# Patient Record
Sex: FEMALE | Race: WHITE | NOT HISPANIC OR LATINO | Employment: OTHER | URBAN - METROPOLITAN AREA
[De-identification: names, ages, dates, MRNs, and addresses within clinical notes are randomized per-mention and may not be internally consistent; named-entity substitution may affect disease eponyms.]

---

## 2019-12-31 ENCOUNTER — APPOINTMENT (OUTPATIENT)
Dept: RADIOLOGY | Facility: CLINIC | Age: 49
End: 2019-12-31
Payer: COMMERCIAL

## 2019-12-31 ENCOUNTER — HOSPITAL ENCOUNTER (EMERGENCY)
Facility: HOSPITAL | Age: 49
Discharge: HOME | End: 2019-12-31
Attending: EMERGENCY MEDICINE | Admitting: EMERGENCY MEDICINE
Payer: COMMERCIAL

## 2019-12-31 ENCOUNTER — OFFICE VISIT (OUTPATIENT)
Dept: URGENT CARE | Facility: CLINIC | Age: 49
End: 2019-12-31
Payer: COMMERCIAL

## 2019-12-31 VITALS
SYSTOLIC BLOOD PRESSURE: 116 MMHG | TEMPERATURE: 97.6 F | HEIGHT: 65 IN | OXYGEN SATURATION: 98 % | HEART RATE: 80 BPM | BODY MASS INDEX: 33.07 KG/M2 | WEIGHT: 198.5 LBS | RESPIRATION RATE: 18 BRPM | DIASTOLIC BLOOD PRESSURE: 73 MMHG

## 2019-12-31 VITALS
BODY MASS INDEX: 32.89 KG/M2 | SYSTOLIC BLOOD PRESSURE: 132 MMHG | HEIGHT: 65 IN | WEIGHT: 197.4 LBS | RESPIRATION RATE: 16 BRPM | OXYGEN SATURATION: 98 % | HEART RATE: 86 BPM | TEMPERATURE: 99.1 F | DIASTOLIC BLOOD PRESSURE: 82 MMHG

## 2019-12-31 DIAGNOSIS — R12 HEARTBURN: ICD-10-CM

## 2019-12-31 DIAGNOSIS — R07.9 CHEST PAIN, UNSPECIFIED TYPE: Primary | ICD-10-CM

## 2019-12-31 DIAGNOSIS — R07.9 CHEST PAIN, UNSPECIFIED TYPE: ICD-10-CM

## 2019-12-31 DIAGNOSIS — R10.13 DYSPEPSIA: ICD-10-CM

## 2019-12-31 DIAGNOSIS — K29.70 GASTRITIS: Primary | ICD-10-CM

## 2019-12-31 LAB
ALBUMIN SERPL BCP-MCNC: 4.2 G/DL (ref 3.5–5)
ALP SERPL-CCNC: 85 U/L (ref 46–116)
ALT SERPL W P-5'-P-CCNC: 69 U/L (ref 12–78)
ANION GAP SERPL CALCULATED.3IONS-SCNC: 10 MMOL/L (ref 4–13)
AST SERPL W P-5'-P-CCNC: 31 U/L (ref 5–45)
ATRIAL RATE: 84 BPM
BASOPHILS # BLD AUTO: 0.04 THOUSANDS/ΜL (ref 0–0.1)
BASOPHILS NFR BLD AUTO: 1 % (ref 0–1)
BILIRUB SERPL-MCNC: 0.4 MG/DL (ref 0.2–1)
BUN SERPL-MCNC: 17 MG/DL (ref 5–25)
CALCIUM SERPL-MCNC: 9 MG/DL (ref 8.3–10.1)
CHLORIDE SERPL-SCNC: 102 MMOL/L (ref 100–108)
CO2 SERPL-SCNC: 29 MMOL/L (ref 21–32)
CREAT SERPL-MCNC: 0.9 MG/DL (ref 0.6–1.3)
EOSINOPHIL # BLD AUTO: 0.19 THOUSAND/ΜL (ref 0–0.61)
EOSINOPHIL NFR BLD AUTO: 3 % (ref 0–6)
ERYTHROCYTE [DISTWIDTH] IN BLOOD BY AUTOMATED COUNT: 11.9 % (ref 11.6–15.1)
GFR SERPL CREATININE-BSD FRML MDRD: 74 ML/MIN/1.73SQ M
GLUCOSE SERPL-MCNC: 90 MG/DL (ref 65–140)
HCT VFR BLD AUTO: 41.4 % (ref 34.8–46.1)
HGB BLD-MCNC: 14.2 G/DL (ref 11.5–15.4)
IMM GRANULOCYTES # BLD AUTO: 0.02 THOUSAND/UL (ref 0–0.2)
IMM GRANULOCYTES NFR BLD AUTO: 0 % (ref 0–2)
LIPASE SERPL-CCNC: 129 U/L (ref 73–393)
LYMPHOCYTES # BLD AUTO: 2.9 THOUSANDS/ΜL (ref 0.6–4.47)
LYMPHOCYTES NFR BLD AUTO: 38 % (ref 14–44)
MCH RBC QN AUTO: 33.6 PG (ref 26.8–34.3)
MCHC RBC AUTO-ENTMCNC: 34.3 G/DL (ref 31.4–37.4)
MCV RBC AUTO: 98 FL (ref 82–98)
MONOCYTES # BLD AUTO: 0.85 THOUSAND/ΜL (ref 0.17–1.22)
MONOCYTES NFR BLD AUTO: 11 % (ref 4–12)
NEUTROPHILS # BLD AUTO: 3.7 THOUSANDS/ΜL (ref 1.85–7.62)
NEUTS SEG NFR BLD AUTO: 47 % (ref 43–75)
NRBC BLD AUTO-RTO: 0 /100 WBCS
P AXIS: 56 DEGREES
PLATELET # BLD AUTO: 237 THOUSANDS/UL (ref 149–390)
PMV BLD AUTO: 8.8 FL (ref 8.9–12.7)
POTASSIUM SERPL-SCNC: 3.7 MMOL/L (ref 3.5–5.3)
PR INTERVAL: 152 MS
PROT SERPL-MCNC: 7.8 G/DL (ref 6.4–8.2)
QRS AXIS: 11 DEGREES
QRSD INTERVAL: 80 MS
QT INTERVAL: 364 MS
QTC INTERVAL: 430 MS
RBC # BLD AUTO: 4.23 MILLION/UL (ref 3.81–5.12)
SODIUM SERPL-SCNC: 141 MMOL/L (ref 136–145)
T WAVE AXIS: 22 DEGREES
TROPONIN I SERPL-MCNC: <0.02 NG/ML
VENTRICULAR RATE: 84 BPM
WBC # BLD AUTO: 7.7 THOUSAND/UL (ref 4.31–10.16)

## 2019-12-31 PROCEDURE — 84484 ASSAY OF TROPONIN QUANT: CPT | Performed by: PHYSICIAN ASSISTANT

## 2019-12-31 PROCEDURE — 99283 EMERGENCY DEPT VISIT LOW MDM: CPT

## 2019-12-31 PROCEDURE — 71046 X-RAY EXAM CHEST 2 VIEWS: CPT

## 2019-12-31 PROCEDURE — 85025 COMPLETE CBC W/AUTO DIFF WBC: CPT | Performed by: PHYSICIAN ASSISTANT

## 2019-12-31 PROCEDURE — 36415 COLL VENOUS BLD VENIPUNCTURE: CPT | Performed by: PHYSICIAN ASSISTANT

## 2019-12-31 PROCEDURE — 93010 ELECTROCARDIOGRAM REPORT: CPT | Performed by: INTERNAL MEDICINE

## 2019-12-31 PROCEDURE — 99285 EMERGENCY DEPT VISIT HI MDM: CPT | Performed by: PHYSICIAN ASSISTANT

## 2019-12-31 PROCEDURE — 93005 ELECTROCARDIOGRAM TRACING: CPT | Performed by: PHYSICIAN ASSISTANT

## 2019-12-31 PROCEDURE — 93005 ELECTROCARDIOGRAM TRACING: CPT

## 2019-12-31 PROCEDURE — 96374 THER/PROPH/DIAG INJ IV PUSH: CPT

## 2019-12-31 PROCEDURE — G0382 LEV 3 HOSP TYPE B ED VISIT: HCPCS | Performed by: PHYSICIAN ASSISTANT

## 2019-12-31 PROCEDURE — 83690 ASSAY OF LIPASE: CPT | Performed by: PHYSICIAN ASSISTANT

## 2019-12-31 PROCEDURE — C9113 INJ PANTOPRAZOLE SODIUM, VIA: HCPCS | Performed by: PHYSICIAN ASSISTANT

## 2019-12-31 PROCEDURE — 80053 COMPREHEN METABOLIC PANEL: CPT | Performed by: PHYSICIAN ASSISTANT

## 2019-12-31 PROCEDURE — 99283 EMERGENCY DEPT VISIT LOW MDM: CPT | Performed by: PHYSICIAN ASSISTANT

## 2019-12-31 RX ORDER — FAMOTIDINE 40 MG/1
40 TABLET, FILM COATED ORAL DAILY
COMMUNITY
Start: 2019-12-21

## 2019-12-31 RX ORDER — BOSENTAN 125 MG/1
125 TABLET, FILM COATED ORAL 2 TIMES DAILY
COMMUNITY

## 2019-12-31 RX ORDER — PANTOPRAZOLE SODIUM 40 MG/1
40 INJECTION, POWDER, FOR SOLUTION INTRAVENOUS ONCE
Status: COMPLETED | OUTPATIENT
Start: 2019-12-31 | End: 2019-12-31

## 2019-12-31 RX ORDER — MAGNESIUM HYDROXIDE/ALUMINUM HYDROXICE/SIMETHICONE 120; 1200; 1200 MG/30ML; MG/30ML; MG/30ML
30 SUSPENSION ORAL ONCE
Status: COMPLETED | OUTPATIENT
Start: 2019-12-31 | End: 2019-12-31

## 2019-12-31 RX ORDER — LISINOPRIL AND HYDROCHLOROTHIAZIDE 20; 12.5 MG/1; MG/1
1 TABLET ORAL DAILY
COMMUNITY
Start: 2019-11-04

## 2019-12-31 RX ORDER — SUCRALFATE 1 G/1
1 TABLET ORAL 4 TIMES DAILY
Qty: 30 TABLET | Refills: 0 | Status: SHIPPED | OUTPATIENT
Start: 2019-12-31

## 2019-12-31 RX ORDER — LIDOCAINE HYDROCHLORIDE 20 MG/ML
15 SOLUTION OROPHARYNGEAL ONCE
Status: COMPLETED | OUTPATIENT
Start: 2019-12-31 | End: 2019-12-31

## 2019-12-31 RX ADMIN — PANTOPRAZOLE SODIUM 40 MG: 40 INJECTION, POWDER, FOR SOLUTION INTRAVENOUS at 16:44

## 2019-12-31 RX ADMIN — MAGNESIUM HYDROXIDE/ALUMINUM HYDROXICE/SIMETHICONE 30 ML: 120; 1200; 1200 SUSPENSION ORAL at 14:57

## 2019-12-31 RX ADMIN — LIDOCAINE HYDROCHLORIDE 15 ML: 20 SOLUTION OROPHARYNGEAL at 14:58

## 2019-12-31 NOTE — PROGRESS NOTES
NAME: Bel Lindsay is a 44 y o  female  : 1970    MRN: [id number]      Assessment and Plan   Chest pain, unspecified type [R07 9]  1  Chest pain, unspecified type  ECG 12 lead    XR chest pa & lateral    Transfer to other facility   2  Dyspepsia  aluminum-magnesium hydroxide-simethicone (MYLANTA) 200-200-20 mg/5 mL oral suspension 30 mL    Lidocaine Viscous HCl (XYLOCAINE) 2 % mucosal solution 15 mL     Administrations This Visit     aluminum-magnesium hydroxide-simethicone (MYLANTA) 200-200-20 mg/5 mL oral suspension 30 mL     Admin Date  2019 Action  Given Dose  30 mL Route  Oral Administered By  Leonardo Stephenson RN          Lidocaine Viscous HCl (XYLOCAINE) 2 % mucosal solution 15 mL     Admin Date  2019 Action  Given Dose  15 mL Route  Swish & Swallow Administered By  Leonardo Stephenson RN              EKG: NSR  Septal infarct age undetermined on the read out, no clear Q waves observed  CXR: costophrenic angles clear  No focal consolidations  No cardiomegaly  Discussed at length with patient how this could be reflux but unable to fully rule out cardiac origin in this facility  Told patient she should go to the ER for this  She states she would like to try a GI cocktail and EKG first and then she would consider ER  Told patient that neither of these things fully rule out cardiac origin  She acknowledges and states she would still like to try anyway  Discussed with patient how with the worsening sx, no improvement with usual medication, different sx/ presentation and her stopping her cholesterol medication all raise question of GERD vs cardiac origin  She states she understands  1536 Discussed EKG findings with patient: lead placement vs septal infarct  Age undetermined meaning could be something that happened in the past or present  15 min post GI cocktail administration: patient states the pain is improving and it does not hurt to take a deep breath anymore   She states she still has some "minor" burning  1554 Discussed all findings with patient- she states she will go to the ER since the GI cocktail didn't "cure" her  She states she feels comfortable driving to [facility] on her own  Patient Instructions   Patient Instructions   Patient going to [facility] for further evaluation and treatment  Proceed to ER if symptoms worsen  Chief Complaint     Chief Complaint   Patient presents with    Heartburn     pt can't sleep         History of Present Illness   Patient with past medical history of hyperlipidemia and hypertension presents complaining of reflux times 6 days  She states on  Day she started to notice worsening reflux symptoms  States she has had GERD for the past 15 years and takes Pepcid for her symptoms  She states that she acutely worsened the morning of  reports since then she has not been pain free  Reports the pain is a substernal burning" sensation which is worse with eating and with deep inspiration  She states since the pain has come on has never completely gone away but does improve and worsen  She states it feels like her typical reflux symptoms except much worse than ever before    States she tried taking Pepto with some improvement  Reports she has been taking her medication as directed  Denies any inciting event that she can think of but does state her diet has been poor on the holidays  Denies any nausea, vomiting or diarrhea  Denies any abdominal pain, back pain or radiating pain  Denies any other chest pain, palpitations, shortness of breath or dyspnea  Denies any diaphoresis, numbness or tingling or pain down the arms or into the jaw  She does report she has high cholesterol and abruptly stopped her cholesterol medication last week due to problems with my liver enzymes   Denies any LE edema or any cardiac hx  Reports her mother had HTN and a few "mini strokes" and her father  of an MI at age 79    Denies any belching or reflux of food or liquid into the mouth  States this is typical of her normal reflux but has not been present in the past few days  Denies hx of smoking  Review of Systems   Review of Systems   Constitutional: Negative for chills and fever  HENT: Negative for congestion  Respiratory: Negative for cough, chest tightness, shortness of breath, wheezing and stridor  Cardiovascular: Positive for chest pain (substernal burning)  Negative for palpitations and leg swelling  Gastrointestinal: Negative for abdominal pain, diarrhea, nausea and vomiting  Musculoskeletal: Negative for back pain  Neurological: Negative for dizziness, light-headedness and headaches  Current Medications       Current Outpatient Medications:     bosentan (TRACLEER) 125 MG tablet, Take 125 mg by mouth 2 (two) times a day, Disp: , Rfl:     famotidine (PEPCID) 40 MG tablet, Take 40 mg by mouth daily, Disp: , Rfl:     lisinopril-hydrochlorothiazide (PRINZIDE,ZESTORETIC) 20-12 5 MG per tablet, Take 1 tablet by mouth daily, Disp: , Rfl:   No current facility-administered medications for this visit  Current Allergies     Allergies as of 12/31/2019    (No Known Allergies)              Past Medical History:   Diagnosis Date    GERD (gastroesophageal reflux disease)     Hyperlipemia        Past Surgical History:   Procedure Laterality Date    BREAST LUMPECTOMY      KIDNEY STONE SURGERY         No family history on file  Medications have been verified      The following portions of the patient's history were reviewed and updated as appropriate: allergies, current medications, past family history, past medical history, past social history, past surgical history and problem list     Objective   /82   Pulse 86   Temp 99 1 °F (37 3 °C) (Tympanic)   Resp 16   Ht 5' 5" (1 651 m)   Wt 89 5 kg (197 lb 6 4 oz)   SpO2 98%   BMI 32 85 kg/m²      Physical Exam     Physical Exam   Constitutional: She appears well-developed and well-nourished  No distress  HENT:   Mouth/Throat: Oropharynx is clear and moist  No oropharyngeal exudate  Neck: No JVD present  Cardiovascular: Normal rate, regular rhythm and normal heart sounds  No LE edema   Pulmonary/Chest: Effort normal and breath sounds normal  No stridor  No respiratory distress  She has no wheezes  She has no rales  Abdominal: Soft  Bowel sounds are normal  She exhibits no distension and no mass  There is no tenderness  There is no rebound and no guarding  Skin: She is not diaphoretic  Nursing note and vitals reviewed  Patient/Caregiver provided printed discharge information.

## 2019-12-31 NOTE — ED PROVIDER NOTES
History  Chief Complaint   Patient presents with    Heartburn     pt presents to ER stating feeling acid reflux got worse past few days  went to urgent care  told to come to ER  denies CP or SOB      Patient is a 43 y/o F that presents to the ED with "heartburn" for 5 days  She states she normally takes pepcid, but it didn't improve  She has also been taking peptobismol, but it didn't help  SHe was seen by urgent care today and given GI cocktail which helped a little  She denies nausea or vomiting  No fevers or chills or SOB  SHe denies leg pain or swelling  No black or bloody stools  No radiation of the pain   She describes it as a burning sensation in her chest        History provided by:  Patient  Heartburn   Location:  Chest  Severity:  Moderate  Onset quality:  Gradual  Duration:  5 days  Timing:  Constant  Progression:  Worsening  Chronicity:  New  Context:  Patient with c/o heartburn x 5 days  Relieved by:  GI cocktail  Worsened by:  Eating  Ineffective treatments:  Pepto bismol and pepcid  Associated symptoms: chest pain    Associated symptoms: no congestion, no cough, no diarrhea, no fever, no nausea, no rash, no rhinorrhea, no shortness of breath and no vomiting        Prior to Admission Medications   Prescriptions Last Dose Informant Patient Reported?  Taking?   bosentan (TRACLEER) 125 MG tablet Not Taking at Unknown time  Yes No   Sig: Take 125 mg by mouth 2 (two) times a day   famotidine (PEPCID) 40 MG tablet   Yes No   Sig: Take 40 mg by mouth daily   lisinopril-hydrochlorothiazide (PRINZIDE,ZESTORETIC) 20-12 5 MG per tablet   Yes No   Sig: Take 1 tablet by mouth daily      Facility-Administered Medications Last Administration Doses Remaining   Lidocaine Viscous HCl (XYLOCAINE) 2 % mucosal solution 15 mL 12/31/2019  2:58 PM 0   aluminum-magnesium hydroxide-simethicone (MYLANTA) 200-200-20 mg/5 mL oral suspension 30 mL 12/31/2019  2:57 PM 0          Past Medical History:   Diagnosis Date    GERD (gastroesophageal reflux disease)     Hyperlipemia     Hyperlipidemia     Hypertension        Past Surgical History:   Procedure Laterality Date    BREAST LUMPECTOMY      KIDNEY STONE SURGERY         History reviewed  No pertinent family history  I have reviewed and agree with the history as documented  Social History     Tobacco Use    Smoking status: Never Smoker    Smokeless tobacco: Never Used   Substance Use Topics    Alcohol use: Yes     Comment: social    Drug use: Never        Review of Systems   Constitutional: Negative for chills and fever  HENT: Negative for congestion and rhinorrhea  Respiratory: Negative for cough and shortness of breath  Cardiovascular: Positive for chest pain  Negative for leg swelling  Gastrointestinal: Negative for diarrhea, nausea and vomiting  Genitourinary: Negative for dysuria  Musculoskeletal: Negative for back pain and neck pain  Skin: Negative for rash  Neurological: Negative for dizziness, weakness and numbness  All other systems reviewed and are negative  Physical Exam  Physical Exam   Constitutional: She is oriented to person, place, and time  She appears well-developed and well-nourished  She is cooperative  She does not appear ill  No distress  HENT:   Head: Normocephalic and atraumatic  Right Ear: Hearing normal    Left Ear: Hearing normal    Nose: Nose normal    Mouth/Throat: Oropharynx is clear and moist and mucous membranes are normal    Eyes: Conjunctivae are normal    Neck: Normal range of motion  Cardiovascular: Normal rate, regular rhythm and normal heart sounds  Pulmonary/Chest: Effort normal and breath sounds normal    Abdominal: Soft  Bowel sounds are normal  There is no tenderness  Musculoskeletal: Normal range of motion  She exhibits no edema  Neurological: She is alert and oriented to person, place, and time  She has normal strength  No sensory deficit  Gait normal    Skin: Skin is warm and dry   No rash noted  She is not diaphoretic  No pallor  Psychiatric: She has a normal mood and affect  Cognition and memory are normal    Nursing note and vitals reviewed        Vital Signs  ED Triage Vitals   Temperature Pulse Respirations Blood Pressure SpO2   12/31/19 1610 12/31/19 1612 12/31/19 1612 12/31/19 1612 12/31/19 1612   97 6 °F (36 4 °C) 80 19 147/79 98 %      Temp src Heart Rate Source Patient Position - Orthostatic VS BP Location FiO2 (%)   -- 12/31/19 1612 12/31/19 1612 12/31/19 1612 --    Monitor Lying Left arm       Pain Score       12/31/19 1612       No Pain           Vitals:    12/31/19 1612   BP: 147/79   Pulse: 80   Patient Position - Orthostatic VS: Lying         Visual Acuity      ED Medications  Medications   pantoprazole (PROTONIX) injection 40 mg (40 mg Intravenous Given 12/31/19 1644)       Diagnostic Studies  Results Reviewed     Procedure Component Value Units Date/Time    Comprehensive metabolic panel [[id number]] Collected:  12/31/19 1640    Lab Status:  Final result Specimen:  Blood from Arm, Left Updated:  12/31/19 1729     Sodium 141 mmol/L      Potassium 3 7 mmol/L      Chloride 102 mmol/L      CO2 29 mmol/L      ANION GAP 10 mmol/L      BUN 17 mg/dL      Creatinine 0 90 mg/dL      Glucose 90 mg/dL      Calcium 9 0 mg/dL      AST 31 U/L      ALT 69 U/L      Alkaline Phosphatase 85 U/L      Total Protein 7 8 g/dL      Albumin 4 2 g/dL      Total Bilirubin 0 40 mg/dL      eGFR 74 ml/min/1 73sq m     Narrative:       Jens guidelines for Chronic Kidney Disease (CKD):     Stage 1 with normal or high GFR (GFR > 90 mL/min/1 73 square meters)    Stage 2 Mild CKD (GFR = 60-89 mL/min/1 73 square meters)    Stage 3A Moderate CKD (GFR = 45-59 mL/min/1 73 square meters)    Stage 3B Moderate CKD (GFR = 30-44 mL/min/1 73 square meters)    Stage 4 Severe CKD (GFR = 15-29 mL/min/1 73 square meters)    Stage 5 End Stage CKD (GFR <15 mL/min/1 73 square meters)  Note: GFR calculation is accurate only with a steady state creatinine    Lipase [[id number]]  (Normal) Collected:  12/31/19 1640    Lab Status:  Final result Specimen:  Blood from Arm, Left Updated:  12/31/19 1729     Lipase 129 u/L     Troponin I [[id number]]  (Normal) Collected:  12/31/19 1640    Lab Status:  Final result Specimen:  Blood from Arm, Left Updated:  12/31/19 1712     Troponin I <0 02 ng/mL     CBC and differential [[id number]]  (Abnormal) Collected:  12/31/19 1640    Lab Status:  Final result Specimen:  Blood from Arm, Left Updated:  12/31/19 1645     WBC 7 70 Thousand/uL      RBC 4 23 Million/uL      Hemoglobin 14 2 g/dL      Hematocrit 41 4 %      MCV 98 fL      MCH 33 6 pg      MCHC 34 3 g/dL      RDW 11 9 %      MPV 8 8 fL      Platelets 229 Thousands/uL      nRBC 0 /100 WBCs      Neutrophils Relative 47 %      Immat GRANS % 0 %      Lymphocytes Relative 38 %      Monocytes Relative 11 %      Eosinophils Relative 3 %      Basophils Relative 1 %      Neutrophils Absolute 3 70 Thousands/µL      Immature Grans Absolute 0 02 Thousand/uL      Lymphocytes Absolute 2 90 Thousands/µL      Monocytes Absolute 0 85 Thousand/µL      Eosinophils Absolute 0 19 Thousand/µL      Basophils Absolute 0 04 Thousands/µL                  No orders to display              Procedures  ECG 12 Lead Documentation Only  Date/Time: 12/31/2019 4:20 PM  Performed by: Mamadou Goff PA-C  Authorized by: Mamadou Goff PA-C     Indications / Diagnosis:  Heartburn  ECG reviewed by me, the ED Provider: yes    Patient location:  ED  Previous ECG:     Previous ECG:  Unavailable  Rate:     ECG rate:  72    ECG rate assessment: normal    Rhythm:     Rhythm: sinus rhythm    Conduction:     Conduction: normal    ST segments:     ST segments:  Normal  T waves:     T waves: normal               ED Course  ED Course as of Dec 31 1739   Tue Dec 31, 2019   1734 Patient states she is feeling much better               HEART Risk Score      Most Recent Value   History  0 Filed at: 12/31/2019 1734   ECG  0 Filed at: 12/31/2019 1734   Age  1 Filed at: 12/31/2019 1734   Risk Factors  1 Filed at: 12/31/2019 1734   Troponin  0 Filed at: 12/31/2019 1734   Heart Score Risk Calculator   History  0 Filed at: 12/31/2019 1734   ECG  0 Filed at: 12/31/2019 1734   Age  1 Filed at: 12/31/2019 1734   Risk Factors  1 Filed at: 12/31/2019 1734   Troponin  0 Filed at: 12/31/2019 1734   HEART Score  2 Filed at: 12/31/2019 1734   HEART Score  2 Filed at: 12/31/2019 1734                            MDM  Number of Diagnoses or Management Options  Gastritis: new and requires workup  Heartburn: new and requires workup  Diagnosis management comments: Patient with heartburn x 5 days, will order labs, EKG to r/o cardiac disease  Patient had CXR at urgent care which was normal     WIll refer to GI for EGD  NO black or bloody stools  Amount and/or Complexity of Data Reviewed  Clinical lab tests: ordered and reviewed  Tests in the radiology section of CPT®: reviewed    Patient Progress  Patient progress: stable        Disposition  Final diagnoses:   Gastritis   Heartburn     Time reflects when diagnosis was documented in both MDM as applicable and the Disposition within this note     Time User Action Codes Description Comment    12/31/2019  5:37 PM Llana Barthel Add [K29 70] Gastritis     12/31/2019  5:38 PM Llana Barthel Add [R12] Heartburn       ED Disposition     ED Disposition Condition Date/Time Comment    Discharge Stable Tue Dec 31, 2019  5:37 PM Alessia Elizabeth discharge to home/self care              Follow-up Information     Follow up With Specialties Details Why Contact Info Additional Information    [facility] [place] [facility] Call in 1 day For recheck [address] [phone]  [facility] [place], [address] [place], [id number]   [phone]          Patient's Medications   Discharge Prescriptions    SUCRALFATE (CARAFATE) 1 G TABLET    Take 1 tablet (1 g total) by mouth 4 (four) times a day       Start Date: 12/31/2019End Date: --       Order Dose: 1 g       Quantity: 30 tablet    Refills: 0     No discharge procedures on file      ED Provider  Electronically Signed by           Sherry Singh PA-C  12/31/19 4209

## 2020-01-04 LAB
ATRIAL RATE: 82 BPM
P AXIS: 41 DEGREES
PR INTERVAL: 154 MS
QRS AXIS: 15 DEGREES
QRSD INTERVAL: 82 MS
QT INTERVAL: 380 MS
QTC INTERVAL: 443 MS
T WAVE AXIS: 19 DEGREES
VENTRICULAR RATE: 82 BPM

## 2020-01-04 PROCEDURE — 93010 ELECTROCARDIOGRAM REPORT: CPT | Performed by: INTERNAL MEDICINE

## 2020-01-13 ENCOUNTER — OFFICE VISIT (OUTPATIENT)
Dept: URGENT CARE | Facility: CLINIC | Age: 50
End: 2020-01-13
Payer: COMMERCIAL

## 2020-01-13 VITALS
RESPIRATION RATE: 20 BRPM | BODY MASS INDEX: 32.55 KG/M2 | DIASTOLIC BLOOD PRESSURE: 81 MMHG | HEART RATE: 106 BPM | WEIGHT: 195.4 LBS | HEIGHT: 65 IN | TEMPERATURE: 99.7 F | SYSTOLIC BLOOD PRESSURE: 136 MMHG | OXYGEN SATURATION: 96 %

## 2020-01-13 DIAGNOSIS — B34.9 NONSPECIFIC SYNDROME SUGGESTIVE OF VIRAL ILLNESS: Primary | ICD-10-CM

## 2020-01-13 DIAGNOSIS — R05.9 COUGH: ICD-10-CM

## 2020-01-13 PROCEDURE — 99283 EMERGENCY DEPT VISIT LOW MDM: CPT | Performed by: PHYSICIAN ASSISTANT

## 2020-01-13 PROCEDURE — G0382 LEV 3 HOSP TYPE B ED VISIT: HCPCS | Performed by: PHYSICIAN ASSISTANT

## 2020-01-13 RX ORDER — ALBUTEROL SULFATE 90 UG/1
2 AEROSOL, METERED RESPIRATORY (INHALATION) EVERY 6 HOURS PRN
Qty: 18 G | Refills: 0 | Status: SHIPPED | OUTPATIENT
Start: 2020-01-13

## 2020-01-13 RX ORDER — OMEPRAZOLE 40 MG/1
CAPSULE, DELAYED RELEASE ORAL
COMMUNITY
Start: 2020-01-03

## 2020-01-13 NOTE — PATIENT INSTRUCTIONS
Viral Syndrome, Ambulatory Care   GENERAL INFORMATION:   Viral syndrome  is a term healthcare providers use for general symptoms of a viral infection that has no clear cause  Common symptoms include the following:   · Fever and chills, or a rash    · Runny or stuffy nose     · Cough, sore throat, or hoarseness     · Headache, or pain and pressure around your eyes     · Muscle aches and joint pain     · Shortness of breath or wheezing     · Abdominal pain, cramps, and diarrhea     · Nausea, vomiting, or loss of appetite  Seek immediate care for the following symptoms:   · Continued vomiting and diarrhea    · Chest pain or trouble breathing  Treatment for a viral syndrome  may include medicines to decrease fever, cough, or stuffy nose  You may also need medicines to relieve a rash, itching, or help treat the viral infection  Manage your symptoms:  Drink liquids as directed to help prevent dehydration  Ask how much liquid to drink each day and which liquids are best for you  You may need to drink an oral rehydration solution (ORS)  An ORS has the right amounts of water, salts, and sugar you need to replace body fluids  Prevent the spread of viral syndrome:   · Wash your hands often  Use soap and water  Wash your hands after you use the bathroom, change a child's diapers, or sneeze  Wash your hands before you prepare or eat food  Use a gel hand  if soap and water are not available  · Wear a mask  to help prevent spreading the virus to others  · Cook and handle food properly  Cook food completely through  Clean food preparation surfaces with a disinfectant  · Ask about vaccinations  You may need an influenza (flu) vaccine, pneumococcal vaccine, or meningococcal vaccine  These vaccines help prevent the flu, pneumonia, and meningococcal disease  Follow up with your healthcare provider as directed:  Write down your questions so you remember to ask them during your visits     CARE AGREEMENT:   You have the right to help plan your care  Learn about your health condition and how it may be treated  Discuss treatment options with your caregivers to decide what care you want to receive  You always have the right to refuse treatment  The above information is an  only  It is not intended as medical advice for individual conditions or treatments  Talk to your doctor, nurse or pharmacist before following any medical regimen to see if it is safe and effective for you  © 2014 [address] is for End User's use only and may not be sold, redistributed or otherwise used for commercial purposes  All illustrations and images included in CareNotes® are the copyrighted property of [employer]  or Pranav Goodman

## 2020-01-13 NOTE — PROGRESS NOTES
Assessment/Plan    Nonspecific syndrome suggestive of viral illness [B34 9]  1  Nonspecific syndrome suggestive of viral illness     2  Cough  albuterol (VENTOLIN HFA) 90 mcg/act inhaler         Subjective:     Patient ID: Lydia Angel is a 44 y o  female  Reason For Visit / Chief Complaint  Chief Complaint   Patient presents with    Cold Like Symptoms     Cough started about a week ago  States was around her two nephews last week who have been dx'd with RSV and are in the hospital  Denies SOB/NASSAR  Cough at night is keeping her up and has vomitted a few times because of coughing too hard  Cough is mainly nonproductive  Has tried OTC Mucinex DM & Advil cold&sinus   Cough         45 yo female presents with NC, rhinorrhea, cough that is productive at times  Pt states that at times her coughing fits make her vomit  Pt states that her symptoms started 1 week ago and her 2 nephews are currently admitted in the hospital with RSV  Pt denies any SOB, chest tightness or Dypsnea  Pt states that she has been taking Advil cold and sinus during the day and Mucinex DM at night to help her sleep since she hasn't slept well due to worsening cough when laying down  Pt denies fevers, chills, abdominal pain, diarrhea, headaches, dizziness, or lightheadedness  Past Medical History:   Diagnosis Date    GERD (gastroesophageal reflux disease)     Hyperlipemia     Hyperlipidemia     Hypertension        Past Surgical History:   Procedure Laterality Date    BREAST LUMPECTOMY      KIDNEY STONE SURGERY         History reviewed  No pertinent family history  Review of Systems   Constitutional: Negative for chills and fever  HENT: Positive for congestion and rhinorrhea  Negative for ear pain and sore throat  Respiratory: Positive for cough  Negative for chest tightness and wheezing  Gastrointestinal: Positive for nausea and vomiting  Negative for abdominal pain  Skin: Negative for rash     Neurological: Negative for dizziness, light-headedness and headaches  All other systems reviewed and are negative  Objective:    /81 (BP Location: Left arm, Patient Position: Sitting, Cuff Size: Standard)   Pulse (!) 106   Temp 99 7 °F (37 6 °C) (Temporal)   Resp 20   Ht 5' 5" (1 651 m)   Wt 88 6 kg (195 lb 6 4 oz)   SpO2 96%   BMI 32 52 kg/m²     Physical Exam   Constitutional: She is oriented to person, place, and time  She appears well-developed and well-nourished  She is active  No distress  HENT:   Head: Normocephalic and atraumatic  Right Ear: Tympanic membrane normal    Left Ear: Tympanic membrane normal    Nose: Mucosal edema and rhinorrhea present  Mouth/Throat: Uvula is midline and mucous membranes are normal  No trismus in the jaw  No uvula swelling  Posterior oropharyngeal erythema (PND) present  Cardiovascular: Normal rate, regular rhythm and normal heart sounds  Pulmonary/Chest: Effort normal and breath sounds normal    Dry cough noted deep breaths   Musculoskeletal: Normal range of motion  Neurological: She is alert and oriented to person, place, and time  Skin: Skin is warm and dry  She is not diaphoretic  Nursing note and vitals reviewed

## 2021-04-08 DIAGNOSIS — Z23 ENCOUNTER FOR IMMUNIZATION: ICD-10-CM

## 2021-04-15 ENCOUNTER — IMMUNIZATIONS (OUTPATIENT)
Dept: FAMILY MEDICINE CLINIC | Facility: HOSPITAL | Age: 51
End: 2021-04-15

## 2021-04-15 DIAGNOSIS — Z23 ENCOUNTER FOR IMMUNIZATION: Primary | ICD-10-CM

## 2021-04-15 PROCEDURE — 0001A SARS-COV-2 / COVID-19 MRNA VACCINE (PFIZER-BIONTECH) 30 MCG: CPT

## 2021-04-15 PROCEDURE — 91300 SARS-COV-2 / COVID-19 MRNA VACCINE (PFIZER-BIONTECH) 30 MCG: CPT

## 2021-05-10 ENCOUNTER — IMMUNIZATIONS (OUTPATIENT)
Dept: FAMILY MEDICINE CLINIC | Facility: HOSPITAL | Age: 51
End: 2021-05-10

## 2021-05-10 DIAGNOSIS — Z23 ENCOUNTER FOR IMMUNIZATION: Primary | ICD-10-CM

## 2021-05-10 PROCEDURE — 91300 SARS-COV-2 / COVID-19 MRNA VACCINE (PFIZER-BIONTECH) 30 MCG: CPT

## 2021-05-10 PROCEDURE — 0002A SARS-COV-2 / COVID-19 MRNA VACCINE (PFIZER-BIONTECH) 30 MCG: CPT

## 2022-06-07 NOTE — DISCHARGE INSTRUCTIONS
Rounded on patient. NAD. Physiological needs met. Patient mother updated on plan of care.      Patient remains under SI precautions.  1:1 observation. q15min safety checks in place. Sitter at bedside.  Patient resting on stretcher.  Take carafate as directed  Follow up with GI doctor for endoscopy  Return to ER if symptoms worsen

## 2023-10-20 ENCOUNTER — TELEPHONE (OUTPATIENT)
Dept: ADMINISTRATIVE | Facility: OTHER | Age: 53
End: 2023-10-20

## 2023-10-20 ENCOUNTER — OFFICE VISIT (OUTPATIENT)
Dept: FAMILY MEDICINE CLINIC | Facility: CLINIC | Age: 53
End: 2023-10-20

## 2023-10-20 VITALS
HEART RATE: 79 BPM | BODY MASS INDEX: 31.16 KG/M2 | DIASTOLIC BLOOD PRESSURE: 74 MMHG | TEMPERATURE: 97.8 F | SYSTOLIC BLOOD PRESSURE: 118 MMHG | WEIGHT: 187 LBS | HEIGHT: 65 IN | OXYGEN SATURATION: 97 %

## 2023-10-20 DIAGNOSIS — Z00.00 ANNUAL PHYSICAL EXAM: Primary | ICD-10-CM

## 2023-10-20 DIAGNOSIS — J45.20 MILD INTERMITTENT ASTHMA, UNSPECIFIED WHETHER COMPLICATED: ICD-10-CM

## 2023-10-20 DIAGNOSIS — R05.9 COUGH: ICD-10-CM

## 2023-10-20 DIAGNOSIS — Z13.1 SCREENING FOR DIABETES MELLITUS: ICD-10-CM

## 2023-10-20 DIAGNOSIS — Z13.29 SCREENING FOR THYROID DISORDER: ICD-10-CM

## 2023-10-20 DIAGNOSIS — E78.2 MIXED HYPERLIPIDEMIA: ICD-10-CM

## 2023-10-20 DIAGNOSIS — I10 ESSENTIAL HYPERTENSION: ICD-10-CM

## 2023-10-20 DIAGNOSIS — Z23 ENCOUNTER FOR IMMUNIZATION: ICD-10-CM

## 2023-10-20 DIAGNOSIS — M79.672 PAIN OF LEFT HEEL: ICD-10-CM

## 2023-10-20 DIAGNOSIS — K21.9 GASTROESOPHAGEAL REFLUX DISEASE, UNSPECIFIED WHETHER ESOPHAGITIS PRESENT: ICD-10-CM

## 2023-10-20 RX ORDER — UBIDECARENONE 100 MG
100 CAPSULE ORAL DAILY
COMMUNITY

## 2023-10-20 RX ORDER — SACCHAROMYCES BOULARDII 250 MG
250 CAPSULE ORAL 2 TIMES DAILY
COMMUNITY

## 2023-10-20 RX ORDER — ATORVASTATIN CALCIUM 20 MG/1
20 TABLET, FILM COATED ORAL DAILY
COMMUNITY
Start: 2023-08-07

## 2023-10-20 RX ORDER — LANOLIN ALCOHOL/MO/W.PET/CERES
1000 CREAM (GRAM) TOPICAL DAILY
COMMUNITY

## 2023-10-20 RX ORDER — ALBUTEROL SULFATE 90 UG/1
2 AEROSOL, METERED RESPIRATORY (INHALATION) EVERY 4 HOURS PRN
Qty: 18 G | Refills: 0 | Status: SHIPPED | OUTPATIENT
Start: 2023-10-20

## 2023-10-20 NOTE — LETTER
Procedure Request Form: Colonoscopy      Date Requested: 10/20/23  Patient: Colville Mattes  Patient : 1970   Referring Provider: Montrell Mcmanus, DO        Date of Procedure ________2019 report______________________       The above patient has informed us that they have completed their   most recent Colonoscopy at your facility. Please complete   this form and attach all corresponding procedure reports/results. Comments __________________________________________________________  ____________________________________________________________________  ____________________________________________________________________  ____________________________________________________________________    Facility Completing Procedure _________________________________________    Form Completed By (print name) _______________________________________      Signature __________________________________________________________      These reports are needed for  compliance. Please fax this completed form and a copy of the procedure report to our office located at [address] [place] [address] as soon as possible to Fax [phone] attention Aimee Gary: Phone [phone]    We thank you for your assistance in treating our mutual patient.

## 2023-10-20 NOTE — PROGRESS NOTES
[facility] PRIMARY CARE    NAME: Ade Yoo  AGE: 45 y.o. SEX: female  : 1970     DATE: 10/20/2023     Assessment and Plan:     Problem List Items Addressed This Visit          Digestive    GERD (gastroesophageal reflux disease)  Stable on omeprazole. Also has rx for carafate that she takes prn. Respiratory    Mild intermittent asthma    Relevant Medications    albuterol (Ventolin HFA) 90 mcg/act inhaler  Wheezes in fall in association with her allergies. Needs new inhaler rx sent to pharmacy  Prevnar 20 ordered       Cardiovascular and Mediastinum    Essential hypertension  Bp at goal on lisinopril-hctz   Continue same. Check cmp       Other    Mixed hyperlipidemia    Relevant Medications    atorvastatin (LIPITOR) 20 mg tablet    Other Relevant Orders    Comprehensive metabolic panel    Lipid panel  No lipid panel on file. Patient taking and tolerating atorvastatin. Check lipid panel. Other Visit Diagnoses       Annual physical exam    -  Primary  Discussed diet and exercise  Screening labs ordered   Declines HIV and hpe C screening. Mammogram up to date. Cervical cancer screen up to date  Colonoscopy up to date. Will obtain records. Recommend shingrix. She will verify that insurance covers as she was told previously it did not?    Prevnar 20 and flu ordered today        Screening for diabetes mellitus        Relevant Orders    Comprehensive metabolic panel    HEMOGLOBIN A1C W/ EAG ESTIMATION    Screening for thyroid disorder        Relevant Orders    TSH, 3rd generation with Free T4 reflex    Cough        Relevant Medications    albuterol (Ventolin HFA) 90 mcg/act inhaler    Encounter for immunization        Relevant Orders    influenza vaccine, quadrivalent, 0.5 mL, preservative-free, for adult and pediatric patients 6 mos+ (AFLURIA, FLUARIX, FLULAVAL, FLUZONE)    Pneumococcal Conjugate Vaccine 20-valent (Pcv20)    Pain of left heel        Relevant Orders    Ambulatory Referral to Podiatry  Achilles tendinitis. Immunizations and preventive care screenings were discussed with patient today. Appropriate education was printed on patient's after visit summary. Counseling:  Alcohol/drug use: discussed moderation in alcohol intake, the recommendations for healthy alcohol use, and avoidance of illicit drug use. Dental Health: discussed importance of regular tooth brushing, flossing, and dental visits. Exercise: the importance of regular exercise/physical activity was discussed. Recommend exercise 3-5 times per week for at least 30 minutes. BMI Counseling: Body mass index is 31.12 kg/m². The BMI is above normal. Nutrition recommendations include encouraging healthy choices of fruits and vegetables. Exercise recommendations include moderate physical activity 150 minutes/week. No pharmacotherapy was ordered. Rationale for BMI follow-up plan is due to patient being overweight or obese. Depression Screening and Follow-up Plan: Patient was screened for depression during today's encounter. They screened negative with a PHQ-2 score of 0. Return in about 6 months (around 4/20/2024) for Recheck. Chief Complaint:     Chief Complaint   Patient presents with   • New Patient Visit       Establish Care / overdue for annual physical / no concerns / [facility] - PCP updated. Stated Concerns-Left heel pain  Last annual-  Hiv/hepc-   Pap-April 2022  Kade- feb 2023 [facility]  PHQ--0  GAD7-   Needs blood work      History of Present Illness:     Adult Annual Physical   Patient is a 45year old new patient who is here for a comprehensive physical exam.   [facility]. Has hypertension, hyperlipidemia GERD and what sounds like mild intermittent asthma (wheezes in the fall in association with her allergies). Uses albuterol for this.    On lisinopril-hctz for her blood pressure and notes that this has controlled bp well. On atorvastatin for her cholesterol. Last labs about a year ago. She notes that she has eating better since finding out about her cholesterol. Has a friend who cooks for her. Eats a lot of vegetarian meals. Some pain in left heel. Started one month ago. Was grocery shopping turned quickly and felt a rip/pop sensation in her heel at the time of onset. Hurts to put weight on affected foot worse with stairs and hills/extending the foot. Mammogram is up to date. February of 2023 at [facility]. Cervical cancer screening done through gyn in [address] [place] [address] in 2022. Colon cancer screening is up to date. Had colonoscopy when she turned 48 at  group in [place]. Immunizations-shingrix not up to date. She was told last year that insurance did not cover until 59. Advised that it is recommended by [employer] at age 46 and should call insurance to clarify as this  Would like flu vaccine today  Does plan to get the covid vaccine this season. Diet and Physical Activity  Diet/Nutrition: well balanced diet and consuming 3-5 servings of fruits/vegetables daily. Exercise: walking. Depression Screening  PHQ-2/9 Depression Screening    Little interest or pleasure in doing things: 0 - not at all  Feeling down, depressed, or hopeless: 0 - not at all  PHQ-2 Score: 0  PHQ-2 Interpretation: Negative depression screen       General Health  Sleep: sleeps well. Hearing: normal - bilateral.  Vision: no vision problems. Dental: regular dental visits. /GYN Health  Patient is: postmenopausal      Advanced Care Planning  Do you have an advanced directive? no  Do you have a durable medical power of ?  no     Review of Systems:     Review of Systems   Past Medical History:     Past Medical History:   Diagnosis Date   • Allergies    • GERD (gastroesophageal reflux disease)    • Hyperlipidemia    • Hypertension    • Kidney stone 1995      Past Surgical History:     Past Surgical History:   Procedure Laterality Date   • APPENDECTOMY     • BREAST LUMPECTOMY Left    • KIDNEY STONE SURGERY        Social History:     Social History     Socioeconomic History   • Marital status:      Spouse name: None   • Number of children: None   • Years of education: None   • Highest education level: None   Occupational History   • None   Tobacco Use   • Smoking status: Never   • Smokeless tobacco: Never   Substance and Sexual Activity   • Alcohol use:  Yes     Alcohol/week: 2.0 standard drinks of alcohol     Types: 2 Standard drinks or equivalent per week     Comment: social   • Drug use: Never   • Sexual activity: Not Currently     Partners: Male     Birth control/protection: None   Other Topics Concern   • None   Social History Narrative    Has 8 sisters and 3 brothers    Is     One child who is 25 (son)     Works as a  (self employed)      Social Determinants of Health     Financial Resource Strain: Not on file   Food Insecurity: Not on file   Transportation Needs: Not on file   Physical Activity: Not on file   Stress: Not on file   Social Connections: Not on file   Intimate Partner Violence: Not on file   Housing Stability: Not on file      Family History:     Family History   Problem Relation Age of Onset   • Kidney failure Mother    • Heart disease Father          from heart disease at age 79   • Breast cancer additional onset Sister         Had breast cancer in  at age 55,  other breast 2017 age 44,  age 60 pancreatic cancer, liver cancer and apinal cancer      Current Medications:     Current Outpatient Medications   Medication Sig Dispense Refill   • albuterol (Ventolin HFA) 90 mcg/act inhaler Inhale 2 puffs every 4 (four) hours as needed for wheezing or shortness of breath 18 g 0   • atorvastatin (LIPITOR) 20 mg tablet Take 20 mg by mouth daily     • co-enzyme Q-10 100 mg capsule Take 100 mg by mouth daily     • lisinopril-hydrochlorothiazide (PRINZIDE,ZESTORETIC) 20-12.5 MG per tablet Take 1 tablet by mouth daily     • omeprazole (PriLOSEC) 40 MG capsule TAKE ONE CAPSULE BY MOUTH BEFORE MORNING MEAL ONCE DAILY     • saccharomyces boulardii (FLORASTOR) 250 mg capsule Take 250 mg by mouth 2 (two) times a day     • sucralfate (CARAFATE) 1 g tablet Take 1 tablet (1 g total) by mouth 4 (four) times a day (Patient taking differently: Take 1 g by mouth 4 (four) times a day as needed Reflux) 30 tablet 0   • vitamin B-12 (VITAMIN B-12) 1,000 mcg tablet Take 1,000 mcg by mouth daily       No current facility-administered medications for this visit. Allergies:     No Known Allergies   Physical Exam:     /74   Pulse 79   Temp 97.8 °F (36.6 °C) (Tympanic)   Ht 5' 5" (1.651 m)   Wt 84.8 kg (187 lb)   SpO2 97%   BMI 31.12 kg/m²     Physical Exam  Vitals and nursing note reviewed. Constitutional:       General: She is not in acute distress. Appearance: Normal appearance. She is not ill-appearing, toxic-appearing or diaphoretic. HENT:      Head: Normocephalic and atraumatic. Right Ear: Tympanic membrane normal.      Left Ear: Tympanic membrane normal.      Nose: Nose normal.      Mouth/Throat:      Mouth: Mucous membranes are dry. Pharynx: No posterior oropharyngeal erythema. Eyes:      Extraocular Movements: Extraocular movements intact. Conjunctiva/sclera: Conjunctivae normal.      Pupils: Pupils are equal, round, and reactive to light. Cardiovascular:      Rate and Rhythm: Normal rate and regular rhythm. Heart sounds: No murmur heard. Pulmonary:      Effort: Pulmonary effort is normal.      Breath sounds: Normal breath sounds. Abdominal:      General: Abdomen is flat. Bowel sounds are normal. There is no distension. Palpations: Abdomen is soft. There is no mass. Tenderness: There is no abdominal tenderness.    Musculoskeletal:      Cervical back: Normal range of motion and neck supple. Right lower leg: No edema. Left lower leg: No edema. Comments: Tenderness left posterior heel. No swelling or deformity   Lymphadenopathy:      Cervical: No cervical adenopathy. Skin:     General: Skin is warm and dry. Findings: No rash. Neurological:      General: No focal deficit present. Mental Status: She is alert and oriented to person, place, and time.    Psychiatric:         Mood and Affect: Mood normal.        Prince Poag, DO  [address] [facility] PRIMARY CARE

## 2023-10-20 NOTE — LETTER
Procedure Request Form: Mammogram      Date Requested: 10/20/23  Patient: Helen Rodgers  Patient : 1970   Referring Provider: Zia Rushing, DO        Date of Procedure ________2023 report______________________       The above patient has informed us that they have completed their   most recent Mammogram at your facility. Please complete   this form and attach all corresponding procedure reports/results. Comments __________________________________________________________  ____________________________________________________________________  ____________________________________________________________________  ____________________________________________________________________    Facility Completing Procedure _________________________________________    Form Completed By (print name) _______________________________________      Signature __________________________________________________________      These reports are needed for  compliance. Please fax this completed form and a copy of the procedure report to our office located at [address] [place] [address] as soon as possible to Fax [phone] attention [facility]: Phone [phone]    We thank you for your assistance in treating our mutual patient.

## 2023-10-20 NOTE — TELEPHONE ENCOUNTER
Upon review of the In Basket request and the patient's chart, initial outreach has been made via fax to facility. Please see Contacts section for details.      Thank you  Wilmer Haro MA

## 2023-10-20 NOTE — LETTER
Procedure Request Form: Mammogram      Date Requested: 10/23/23  Patient: Rc Suazo  Patient : 1970   Referring Provider: Deandre Manning DO    2nd Request        Date of Procedure _________2023 report_____________________       The above patient has informed us that they have completed their   most recent Mammogram at your facility. Please complete   this form and attach all corresponding procedure reports/results. Comments __________________________________________________________  ____________________________________________________________________  ____________________________________________________________________  ____________________________________________________________________    Facility Completing Procedure _________________________________________    Form Completed By (print name) _______________________________________      Signature __________________________________________________________      These reports are needed for  compliance. Please fax this completed form and a copy of the procedure report to our office located at [address] [place] [address] as soon as possible to Fax [phone] krupa [facility]: Phone [phone]    We thank you for your assistance in treating our mutual patient.

## 2023-10-20 NOTE — PATIENT INSTRUCTIONS
Wellness Visit for Adults   AMBULATORY CARE:   A wellness visit  is when you see your healthcare provider to get screened for health problems. Your healthcare provider will also give you advice on how to stay healthy. Write down your questions so you remember to ask them. Ask your healthcare provider how often you should have a wellness visit. What happens at a wellness visit:  Your healthcare provider will ask about your health, and your family history of health problems. This includes high blood pressure, heart disease, and cancer. He or she will ask if you have symptoms that concern you, if you smoke, and about your mood. You may also be asked about your intake of medicines, supplements, food, and alcohol. Any of the following may be done: Your weight  will be checked. Your height may also be checked so your body mass index (BMI) can be calculated. Your BMI shows if you are at a healthy weight. Your blood pressure  and heart rate will be checked. Your temperature may also be checked. Blood and urine tests  may be done. Blood tests may be done to check your cholesterol levels. Abnormal cholesterol levels increase your risk for heart disease and stroke. You may also need a blood or urine test to check for diabetes if you are at increased risk. Urine tests may be done to look for signs of an infection or kidney disease. A physical exam  includes checking your heartbeat and lungs with a stethoscope. Your healthcare provider may also check your skin to look for sun damage. Screening tests  may be recommended. A screening test is done to check for diseases that may not cause symptoms. The screening tests you may need depend on your age, gender, family history, and lifestyle habits. For example, colorectal screening may be recommended if you are 48years old or older. Screening tests you need if you are a woman:   A Pap smear  is used to screen for cervical cancer.  Pap smears are usually done every 3 to 5 years depending on your age. You may need them more often if you have had abnormal Pap smear test results in the past. Ask your healthcare provider how often you should have a Pap smear. A mammogram  is an x-ray of your breasts to screen for breast cancer. Experts recommend mammograms every 2 years starting at age 48 years. You may need a mammogram at age 52 years or younger if you have an increased risk for breast cancer. Talk to your healthcare provider about when you should start having mammograms and how often you need them. Vaccines you may need:   Get an influenza vaccine  every year. The influenza vaccine protects you from the flu. Several types of viruses cause the flu. The viruses change over time, so new vaccines are made each year. Get a tetanus-diphtheria (Td) booster vaccine  every 10 years. This vaccine protects you against tetanus and diphtheria. Tetanus is a severe infection that may cause painful muscle spasms and lockjaw. Diphtheria is a severe bacterial infection that causes a thick covering in the back of your mouth and throat. Get a human papillomavirus (HPV) vaccine  if you are female and aged 23 to 32 or male 23 to 24 and never received it. This vaccine protects you from HPV infection. HPV is the most common infection spread by sexual contact. HPV may also cause vaginal, penile, and anal cancers. Get a pneumococcal vaccine  if you are aged 72 years or older. The pneumococcal vaccine is an injection given to protect you from pneumococcal disease. Pneumococcal disease is an infection caused by pneumococcal bacteria. The infection may cause pneumonia, meningitis, or an ear infection. Get a shingles vaccine  if you are 60 or older, even if you have had shingles before. The shingles vaccine is an injection to protect you from the varicella-zoster virus. This is the same virus that causes chickenpox.  Shingles is a painful rash that develops in people who had chickenpox or have been exposed to the virus. How to eat healthy:  My Plate is a model for planning healthy meals. It shows the types and amounts of foods that should go on your plate. Fruits and vegetables make up about half of your plate, and grains and protein make up the other half. A serving of dairy is included on the side of your plate. The amount of calories and serving sizes you need depends on your age, gender, weight, and height. Examples of healthy foods are listed below:  Eat a variety of vegetables  such as dark green, red, and orange vegetables. You can also include canned vegetables low in sodium (salt) and frozen vegetables without added butter or sauces. Eat a variety of fresh fruits , canned fruit in 100% juice, frozen fruit, and dried fruit. Include whole grains. At least half of the grains you eat should be whole grains. Examples include whole-wheat bread, wheat pasta, brown rice, and whole-grain cereals such as oatmeal.    Eat a variety of protein foods such as seafood (fish and shellfish), lean meat, and poultry without skin (turkey and chicken). Examples of lean meats include pork leg, shoulder, or tenderloin, and beef round, sirloin, tenderloin, and extra lean ground beef. Other protein foods include eggs and egg substitutes, beans, peas, soy products, nuts, and seeds. Choose low-fat dairy products such as skim or 1% milk or low-fat yogurt, cheese, and cottage cheese. Limit unhealthy fats  such as butter, hard margarine, and shortening. Exercise:  Exercise at least 30 minutes per day on most days of the week. Some examples of exercise include walking, biking, dancing, and swimming. You can also fit in more physical activity by taking the stairs instead of the elevator or parking farther away from stores. Include muscle strengthening activities 2 days each week. Regular exercise provides many health benefits.  It helps you manage your weight, and decreases your risk for type 2 diabetes, heart disease, stroke, and high blood pressure. Exercise can also help improve your mood. Ask your healthcare provider about the best exercise plan for you. General health and safety guidelines:   Do not smoke. Nicotine and other chemicals in cigarettes and cigars can cause lung damage. Ask your healthcare provider for information if you currently smoke and need help to quit. E-cigarettes or smokeless tobacco still contain nicotine. Talk to your healthcare provider before you use these products. Limit alcohol. A drink of alcohol is 12 ounces of beer, 5 ounces of wine, or 1½ ounces of liquor. Lose weight, if needed. Being overweight increases your risk of certain health conditions. These include heart disease, high blood pressure, type 2 diabetes, and certain types of cancer. Protect your skin. Do not sunbathe or use tanning beds. Use sunscreen with a SPF 15 or higher. Apply sunscreen at least 15 minutes before you go outside. Reapply sunscreen every 2 hours. Wear protective clothing, hats, and sunglasses when you are outside. Drive safely. Always wear your seatbelt. Make sure everyone in your car wears a seatbelt. A seatbelt can save your life if you are in an accident. Do not use your cell phone when you are driving. This could distract you and cause an accident. Pull over if you need to make a call or send a text message. Practice safe sex. Use latex condoms if are sexually active and have more than one partner. Your healthcare provider may recommend screening tests for sexually transmitted infections (STIs). Wear helmets, lifejackets, and protective gear. Always wear a helmet when you ride a bike or motorcycle, go skiing, or play sports that could cause a head injury. Wear protective equipment when you play sports. Wear a lifejacket when you are on a boat or doing water sports.     © Copyright [facility] 2023 Information is for End User's use only and may not be sold, redistributed or otherwise used for commercial purposes. The above information is an  only. It is not intended as medical advice for individual conditions or treatments. Talk to your doctor, nurse or pharmacist before following any medical regimen to see if it is safe and effective for you.

## 2023-10-23 NOTE — TELEPHONE ENCOUNTER
Upon review of the In Basket request we were able to locate, review, and update the patient chart as requested for CRC: Colonoscopy. Any additional questions or concerns should be emailed to the Practice Liaisons via the appropriate education email address, please do not reply via In Basket.     Thank you  Lonny Cee MA

## 2023-10-23 NOTE — TELEPHONE ENCOUNTER
As a follow-up, a second attempt has been made for outreach via fax to facility. Please see Contacts section for details.     Thank you  Donna Blum MA

## 2023-10-24 NOTE — TELEPHONE ENCOUNTER
Upon review of the In Basket request we were able to locate, review, and update the patient chart as requested for Mammogram.    Any additional questions or concerns should be emailed to the Practice Liaisons via the appropriate education email address, please do not reply via In Basket.     Thank you  Malena Medel MA

## 2023-10-28 DIAGNOSIS — E78.2 MIXED HYPERLIPIDEMIA: Primary | ICD-10-CM

## 2023-10-30 RX ORDER — ATORVASTATIN CALCIUM 20 MG/1
20 TABLET, FILM COATED ORAL DAILY
Qty: 30 TABLET | Refills: 0 | Status: SHIPPED | OUTPATIENT
Start: 2023-10-30

## 2023-10-30 NOTE — TELEPHONE ENCOUNTER
Patient is aware that Dr. Mili Pinon approved and sent rx for #30 day supply. Patient to complete labs.

## 2023-11-04 LAB
ALBUMIN SERPL-MCNC: 4.5 G/DL (ref 3.8–4.9)
ALBUMIN/GLOB SERPL: 1.9 {RATIO} (ref 1.2–2.2)
ALP SERPL-CCNC: 89 IU/L (ref 44–121)
ALT SERPL-CCNC: 18 IU/L (ref 0–32)
AST SERPL-CCNC: 22 IU/L (ref 0–40)
BILIRUB SERPL-MCNC: 0.5 MG/DL (ref 0–1.2)
BUN SERPL-MCNC: 14 MG/DL (ref 6–24)
BUN/CREAT SERPL: 17 (ref 9–23)
CALCIUM SERPL-MCNC: 9.4 MG/DL (ref 8.7–10.2)
CHLORIDE SERPL-SCNC: 100 MMOL/L (ref 96–106)
CHOLEST SERPL-MCNC: 199 MG/DL (ref 100–199)
CHOLEST/HDLC SERPL: 3.6 RATIO (ref 0–4.4)
CO2 SERPL-SCNC: 25 MMOL/L (ref 20–29)
CREAT SERPL-MCNC: 0.83 MG/DL (ref 0.57–1)
EGFR: 83 ML/MIN/1.73
EST. AVERAGE GLUCOSE BLD GHB EST-MCNC: 108 MG/DL
GLOBULIN SER-MCNC: 2.4 G/DL (ref 1.5–4.5)
GLUCOSE SERPL-MCNC: 94 MG/DL (ref 70–99)
HBA1C MFR BLD: 5.4 % (ref 4.8–5.6)
HDLC SERPL-MCNC: 55 MG/DL
LDLC SERPL CALC-MCNC: 117 MG/DL (ref 0–99)
POTASSIUM SERPL-SCNC: 4.1 MMOL/L (ref 3.5–5.2)
PROT SERPL-MCNC: 6.9 G/DL (ref 6–8.5)
SL AMB VLDL CHOLESTEROL CALC: 27 MG/DL (ref 5–40)
SODIUM SERPL-SCNC: 138 MMOL/L (ref 134–144)
TRIGL SERPL-MCNC: 154 MG/DL (ref 0–149)
TSH SERPL DL<=0.005 MIU/L-ACNC: 0.49 UIU/ML (ref 0.45–4.5)

## 2023-11-06 ENCOUNTER — TELEPHONE (OUTPATIENT)
Dept: FAMILY MEDICINE CLINIC | Facility: CLINIC | Age: 53
End: 2023-11-06

## 2023-11-06 NOTE — TELEPHONE ENCOUNTER
----- Message from Maynor Lynn DO sent at 11/6/2023 12:02 PM EST -----  Can let patient know that her labs looked okay other than slight elevation of her triglycerides and LDL (bad cholesterol).  Should just try and follow low fat, low cholesterol diet

## 2023-11-07 DIAGNOSIS — I10 ESSENTIAL HYPERTENSION: Primary | ICD-10-CM

## 2023-11-07 DIAGNOSIS — K21.9 GASTROESOPHAGEAL REFLUX DISEASE, UNSPECIFIED WHETHER ESOPHAGITIS PRESENT: ICD-10-CM

## 2023-11-07 RX ORDER — OMEPRAZOLE 40 MG/1
40 CAPSULE, DELAYED RELEASE ORAL DAILY
Qty: 90 CAPSULE | Refills: 1 | Status: SHIPPED | OUTPATIENT
Start: 2023-11-07

## 2023-11-07 RX ORDER — LISINOPRIL AND HYDROCHLOROTHIAZIDE 20; 12.5 MG/1; MG/1
1 TABLET ORAL DAILY
Qty: 90 TABLET | Refills: 1 | Status: SHIPPED | OUTPATIENT
Start: 2023-11-07

## 2023-11-21 ENCOUNTER — APPOINTMENT (OUTPATIENT)
Dept: RADIOLOGY | Facility: CLINIC | Age: 53
End: 2023-11-21
Payer: COMMERCIAL

## 2023-11-21 ENCOUNTER — OFFICE VISIT (OUTPATIENT)
Dept: PODIATRY | Facility: CLINIC | Age: 53
End: 2023-11-21
Payer: COMMERCIAL

## 2023-11-21 VITALS — HEIGHT: 65 IN | BODY MASS INDEX: 31.16 KG/M2 | WEIGHT: 187 LBS

## 2023-11-21 DIAGNOSIS — M79.672 PAIN OF LEFT HEEL: ICD-10-CM

## 2023-11-21 DIAGNOSIS — M72.2 PLANTAR FASCIITIS OF LEFT FOOT: Primary | ICD-10-CM

## 2023-11-21 PROCEDURE — 99202 OFFICE O/P NEW SF 15 MIN: CPT | Performed by: PODIATRIST

## 2023-11-21 PROCEDURE — 73630 X-RAY EXAM OF FOOT: CPT

## 2023-11-21 RX ORDER — MELOXICAM 15 MG/1
15 TABLET ORAL DAILY
Qty: 30 TABLET | Refills: 0 | Status: SHIPPED | OUTPATIENT
Start: 2023-11-21 | End: 2024-01-02 | Stop reason: SDUPTHER

## 2023-11-21 NOTE — LETTER
"PLANTAR FASCIITIS & HEEL PAIN  TENDONITIS: Achilles,  Posterior Tibial,  Peroneal,  Other……  \"Physical Therapy @ Home\" is absolutely necessary to obtain, and sustain a long lasting resolution of your heel pain or tendonitis.  If you perform the following you can greatly accelerate your recovery and reduce the chance of a recurrence, which is not uncommon.  Don't get frustrated it sometimes takes months to resolve 100%    The longer you have had this problem the longer it takes to resolve it…    MEDICATION:   If prescribed take as instructed with food, never on an empty stomach.  Stop taking if you have any side effects. (ie.Rash, GI upset, Acid Reflux/Heartburn)  Motrin/Ibuprofen or ALEVE/Naproxen   Rx NSAIDS (ie. Mobic, Diclofenac, Celebrex etc)  ICE:   Apply daily for 10 min. intervals, waiting 5 min. before next application.   Three applications over 45 min. should be done after work, or immediately following an athletic/strenuous event.  Frozen water bottles can be used to ice and massage rolling on floor.  No heat unless instructed.  STRETCHING:  Any stretching activity of  your Achilles Tendon/Calf muscles in the lower leg will also stretch your Plantar Fasciia.    Hold stretch for 8-10 sec., Repeat 10-12 times per setting, for (5-6) times per day.    Stand arms length away from a wall, place foot to be stretched half a shoe length behind the other, then step forward with the other performing a lazy push-up against the wall.  MASSAGE:  Deep friction technique using moisturizer for 10-15 min. daily.  Circles/Figure Eight's with Toes reversing direction for Tendonitis  ORTHOTICS:   Can be very helpful but varies based on foot structure   Custom or basic off the shelf products.  Many options available.  Powerstep [place] is an effective OTC device ([employer]).  PROPER SHOE SELECTION:  Shoes must have adequate support and structural integrity.  Forget loafers, flip-flops, and slip on flats!  Lace up type shoes are " recommended due to their ability to fit orthotics/arch supports.  Some athletic type sandals are acceptable.  Running type sneakers are the best choice.   ([employer], New Balance 1540 or 940, ASICS, [employer] Adrenaline GTS)  GENERAL ADVICE:   If it hurts slow down or stop the activity.  Avoid certain activities (jumping, climbing ladders, steep inclines/uneven terrain)    Put foot through range of motion if tight prior to getting up after inactivity/resting/sleeping.    Distance running and Treadmills can aggravate the situation and prolong recovery.  Bicycle/Elliptical is better than Jogging/Running.

## 2023-11-21 NOTE — PROGRESS NOTES
Assessment/Plan:  Treatment options discussed with patient for Planter fasciitis.  Recommend starting with a conservative approach and evolving the treatment plan based on her progress.  Rx meloxicam 15 mg once a day  Dispensed instructions on appropriate stretching icing and range of motion.  Rx x-ray left foot.  X-rays personally reviewed from 11/21/2023 show no fracture dislocation or severe degenerative arthritis.  Of note is a significantly large infracalcaneal spur, retrocalcaneal spur is minimal.  Discussed with patient the importance of proper supportive athletic shoe gear and recommendations have been made.  Follow-up in 6 weeks.    No problem-specific Assessment & Plan notes found for this encounter.   Diagnoses and all orders for this visit:    Plantar fasciitis of left foot    Pain of left heel  -     Ambulatory Referral to Podiatry  -     X-ray foot left 3+ views; Future  -     Discontinue: meloxicam (Mobic) 15 mg tablet; Take 1 tablet (15 mg total) by mouth daily          Subjective:      Patient ID: Lucinda Dumont is a 53 y.o. female.    11/21/2023: 53-year-old female presents with chief complaint of painful left heel.  Patient reports 2 years ago having plantar fasciitis on her right side which got better then the left started having symptoms after she turned quick in the store and this exacerbated the pain.        The following portions of the patient's history were reviewed and updated as appropriate: allergies, current medications, past family history, past medical history, past social history, past surgical history, and problem list.    Review of Systems   Constitutional: Negative.    HENT: Negative.     Eyes: Negative.    Respiratory: Negative.     Cardiovascular: Negative.    Gastrointestinal: Negative.    Endocrine: Negative.    Genitourinary: Negative.    Musculoskeletal:         Pain in left heel   Skin: Negative.    Allergic/Immunologic: Negative.    Neurological: Negative.    Hematological:  "Negative.    Psychiatric/Behavioral: Negative.           Objective:      Ht 5' 5\" (1.651 m)   Wt 84.8 kg (187 lb)   BMI 31.12 kg/m²          Physical Exam  Constitutional:       Appearance: Normal appearance.   HENT:      Head: Normocephalic.      Right Ear: External ear normal.      Left Ear: External ear normal.   Eyes:      Pupils: Pupils are equal, round, and reactive to light.   Cardiovascular:      Rate and Rhythm: Normal rate.      Pulses: Normal pulses.   Pulmonary:      Effort: Pulmonary effort is normal.   Musculoskeletal:         General: Tenderness present. Normal range of motion.      Cervical back: Normal range of motion.      Comments:   Left: Moderate pain with palpation left heel at plantar medial insertion of plantar fascia.  No edema or erythema noted.  No pain with subtalar joint or ankle joint range of motion.    Right: No pain with palpation right heel   Feet:      Right foot:      Protective Sensation: 10 sites tested.  10 sites sensed.      Skin integrity: Skin integrity normal.      Left foot:      Protective Sensation: 10 sites tested.  10 sites sensed.      Skin integrity: Skin integrity normal.   Skin:     General: Skin is warm and dry.      Capillary Refill: Capillary refill takes 2 to 3 seconds.   Neurological:      General: No focal deficit present.      Mental Status: She is alert.   Psychiatric:         Mood and Affect: Mood normal.           "

## 2024-01-02 ENCOUNTER — OFFICE VISIT (OUTPATIENT)
Dept: PODIATRY | Facility: CLINIC | Age: 54
End: 2024-01-02
Payer: COMMERCIAL

## 2024-01-02 VITALS
HEIGHT: 65 IN | SYSTOLIC BLOOD PRESSURE: 107 MMHG | DIASTOLIC BLOOD PRESSURE: 70 MMHG | HEART RATE: 91 BPM | BODY MASS INDEX: 31.99 KG/M2 | WEIGHT: 192 LBS

## 2024-01-02 DIAGNOSIS — M72.2 PLANTAR FASCIITIS OF LEFT FOOT: Primary | ICD-10-CM

## 2024-01-02 DIAGNOSIS — M79.672 PAIN OF LEFT HEEL: ICD-10-CM

## 2024-01-02 DIAGNOSIS — G47.00 INSOMNIA, UNSPECIFIED TYPE: Primary | ICD-10-CM

## 2024-01-02 PROCEDURE — 99213 OFFICE O/P EST LOW 20 MIN: CPT | Performed by: PODIATRIST

## 2024-01-02 RX ORDER — TRAZODONE HYDROCHLORIDE 100 MG/1
100 TABLET ORAL
Qty: 90 TABLET | Refills: 0 | Status: SHIPPED | OUTPATIENT
Start: 2024-01-02

## 2024-01-02 RX ORDER — MELOXICAM 15 MG/1
15 TABLET ORAL DAILY
Qty: 30 TABLET | Refills: 0 | Status: SHIPPED | OUTPATIENT
Start: 2024-01-02 | End: 2024-02-27 | Stop reason: SDUPTHER

## 2024-01-02 NOTE — PROGRESS NOTES
Assessment/Plan:   Emphasized with patient the importance of her doing her part at home stretching icing and doing range of motion exercises on a daily basis.  Rx meloxicam 15 mg once a day with food never on an empty stomach.  Consider organized referral to physical therapy if we fail to make any progress by the next follow-up and/or may consider an injection with corticosteroid..     Diagnoses and all orders for this visit:    Plantar fasciitis of left foot    Pain of left heel  -     meloxicam (Mobic) 15 mg tablet; Take 1 tablet (15 mg total) by mouth daily          Subjective:     Patient ID: Lucinda Dumont is a 53 y.o. female.    1/2/2024: 53-year-old female seen today for follow-up painful left heel.  Patient reports only maybe a 15% improvement at best.  Does confess that she has not been stretching as instructed.    11/21/2023: 53-year-old female presents with chief complaint of painful left heel.  Patient reports 2 years ago having plantar fasciitis on her right side which got better then the left started having symptoms after she turned quick in the store and this exacerbated the pain.        Review of Systems   Constitutional: Negative.    HENT: Negative.     Eyes: Negative.    Respiratory: Negative.     Cardiovascular: Negative.    Gastrointestinal: Negative.    Endocrine: Negative.    Genitourinary: Negative.    Musculoskeletal:         Moderate left heel pain persists   Skin: Negative.    Allergic/Immunologic: Negative.    Neurological: Negative.    Hematological: Negative.    Psychiatric/Behavioral: Negative.           Objective:     Physical Exam  Constitutional:       Appearance: Normal appearance.   HENT:      Head: Normocephalic.      Right Ear: External ear normal.      Left Ear: External ear normal.   Eyes:      Pupils: Pupils are equal, round, and reactive to light.   Cardiovascular:      Rate and Rhythm: Normal rate.      Pulses: Normal pulses.   Pulmonary:      Effort: Pulmonary effort is  normal.   Musculoskeletal:         General: Tenderness present.      Cervical back: Normal range of motion.      Comments: Moderate pain with palpation plantar medial insertion of left heel plantar fascia.  No edema or erythema noted.  No pain with subtalar joint or ankle joint range of motion.   Feet:      Right foot:      Protective Sensation: 10 sites tested.  10 sites sensed.      Skin integrity: Skin integrity normal.      Left foot:      Protective Sensation: 10 sites tested.  10 sites sensed.      Skin integrity: Skin integrity normal.   Skin:     General: Skin is warm and dry.      Capillary Refill: Capillary refill takes 2 to 3 seconds.   Neurological:      General: No focal deficit present.      Mental Status: She is alert.   Psychiatric:         Mood and Affect: Mood normal.

## 2024-01-30 ENCOUNTER — PATIENT MESSAGE (OUTPATIENT)
Dept: FAMILY MEDICINE CLINIC | Facility: CLINIC | Age: 54
End: 2024-01-30

## 2024-01-30 DIAGNOSIS — Z12.31 ENCOUNTER FOR SCREENING MAMMOGRAM FOR BREAST CANCER: ICD-10-CM

## 2024-02-13 DIAGNOSIS — E78.2 MIXED HYPERLIPIDEMIA: ICD-10-CM

## 2024-02-13 RX ORDER — ATORVASTATIN CALCIUM 20 MG/1
20 TABLET, FILM COATED ORAL DAILY
Qty: 30 TABLET | Refills: 0 | Status: SHIPPED | OUTPATIENT
Start: 2024-02-13

## 2024-02-27 ENCOUNTER — OFFICE VISIT (OUTPATIENT)
Dept: PODIATRY | Facility: CLINIC | Age: 54
End: 2024-02-27
Payer: COMMERCIAL

## 2024-02-27 VITALS
DIASTOLIC BLOOD PRESSURE: 70 MMHG | WEIGHT: 192 LBS | HEIGHT: 65 IN | SYSTOLIC BLOOD PRESSURE: 111 MMHG | BODY MASS INDEX: 31.99 KG/M2

## 2024-02-27 DIAGNOSIS — M72.2 PLANTAR FASCIITIS OF LEFT FOOT: Primary | ICD-10-CM

## 2024-02-27 DIAGNOSIS — M79.672 PAIN OF LEFT HEEL: ICD-10-CM

## 2024-02-27 PROCEDURE — 20550 NJX 1 TENDON SHEATH/LIGAMENT: CPT | Performed by: PODIATRIST

## 2024-02-27 RX ORDER — LIDOCAINE HYDROCHLORIDE 10 MG/ML
1 INJECTION, SOLUTION INFILTRATION; PERINEURAL ONCE
Status: COMPLETED | OUTPATIENT
Start: 2024-02-27 | End: 2024-02-27

## 2024-02-27 RX ORDER — MELOXICAM 15 MG/1
15 TABLET ORAL DAILY
Qty: 30 TABLET | Refills: 0 | Status: SHIPPED | OUTPATIENT
Start: 2024-02-27 | End: 2024-03-28

## 2024-02-27 RX ORDER — TRIAMCINOLONE ACETONIDE 40 MG/ML
10 INJECTION, SUSPENSION INTRA-ARTICULAR; INTRAMUSCULAR ONCE
Status: COMPLETED | OUTPATIENT
Start: 2024-02-27 | End: 2024-02-27

## 2024-02-27 RX ORDER — BUPIVACAINE HYDROCHLORIDE 5 MG/ML
1 INJECTION, SOLUTION PERINEURAL ONCE
Status: COMPLETED | OUTPATIENT
Start: 2024-02-27 | End: 2024-02-27

## 2024-02-27 RX ORDER — DEXAMETHASONE SODIUM PHOSPHATE 4 MG/ML
2 INJECTION, SOLUTION INTRA-ARTICULAR; INTRALESIONAL; INTRAMUSCULAR; INTRAVENOUS; SOFT TISSUE ONCE
Status: COMPLETED | OUTPATIENT
Start: 2024-02-27 | End: 2024-02-27

## 2024-02-27 RX ADMIN — BUPIVACAINE HYDROCHLORIDE 1 ML: 5 INJECTION, SOLUTION PERINEURAL at 16:41

## 2024-02-27 RX ADMIN — LIDOCAINE HYDROCHLORIDE 1 ML: 10 INJECTION, SOLUTION INFILTRATION; PERINEURAL at 16:41

## 2024-02-27 RX ADMIN — DEXAMETHASONE SODIUM PHOSPHATE 2 MG: 4 INJECTION, SOLUTION INTRA-ARTICULAR; INTRALESIONAL; INTRAMUSCULAR; INTRAVENOUS; SOFT TISSUE at 16:41

## 2024-02-27 RX ADMIN — TRIAMCINOLONE ACETONIDE 10 MG: 40 INJECTION, SUSPENSION INTRA-ARTICULAR; INTRAMUSCULAR at 16:41

## 2024-02-27 NOTE — PROGRESS NOTES
Assessment/Plan:   After discussing further treatment options for chronic plantar fasciitis patient has requested we proceed with an injection of corticosteroid.  Injection as noted below.  Patient instructed to return home and rest her foot for the evening icing with gentle stretching.  Call if any signs of increasing redness or infection noted within the next 3 days.  Follow-up in approximately 6 weeks.  Meloxicam refill ordered.       Diagnoses and all orders for this visit:    Plantar fasciitis of left foot  -     Foot/lower extremity injection  -     lidocaine (XYLOCAINE) 1 % injection 1 mL  -     bupivacaine (MARCAINE) 0.5 % injection 1 mL  -     dexamethasone (DECADRON) injection 2 mg  -     triamcinolone acetonide (KENALOG-40) 40 mg/mL injection 10 mg    Pain of left heel  -     meloxicam (Mobic) 15 mg tablet; Take 1 tablet (15 mg total) by mouth daily     Foot/lower extremity injection    Performed by: Codey Saini DPM  Authorized by: Codey Saini DPM    Procedure:     Other Assisting Provider: No      Verbal consent obtained?: Yes      Risks and benefits: Risks, benefits and alternatives were discussed      Consent given by:  Patient    Time out: Immediately prior to the procedure a time out was called      Patient states understanding of procedure being performed: Yes      Site marked: Yes      Patient identity confirmed:  Verbally with patient    Supporting Documentation:     Indications:  Pain    Procedure Details:    Prep: patient was prepped and draped in usual sterile fashion                Ethyl Chloride was applied      Needle size: 25 G G    Ultrasound Guidance: no      Approach:  Medial    Laterality:  Left    Cyst Aspiration/Injection: No      Location: aponeurosis      Aponeurosis Structures: Plantar fascia origin      Injection Information:       Patient tolerance:  Patient tolerated the procedure well with no immediate complications    Dressing: sterile dressing applied           Subjective:     Patient ID: Lucinda Dumont is a 53 y.o. female.    2/27/2024: 53-year-old female seen today for follow-up chronic left heel pain.  Reports she has been stretching icing and doing everything she should but she has still only improved a very slight amount and total now she would approximate maybe 30% improved since her initial presentation.    1/2/2024: 53-year-old female seen today for follow-up painful left heel.  Patient reports only maybe a 15% improvement at best.  Does confess that she has not been stretching as instructed.    11/21/2023: 53-year-old female presents with chief complaint of painful left heel.  Patient reports 2 years ago having plantar fasciitis on her right side which got better then the left started having symptoms after she turned quick in the store and this exacerbated the pain.        Review of Systems   Constitutional: Negative.    HENT: Negative.     Eyes: Negative.    Respiratory: Negative.     Cardiovascular: Negative.    Gastrointestinal: Negative.    Endocrine: Negative.    Genitourinary: Negative.    Musculoskeletal:         Moderate left heel pain persists   Skin: Negative.    Allergic/Immunologic: Negative.    Neurological: Negative.    Hematological: Negative.    Psychiatric/Behavioral: Negative.           Objective:     Physical Exam  Constitutional:       Appearance: Normal appearance.   HENT:      Head: Normocephalic.      Right Ear: External ear normal.      Left Ear: External ear normal.   Eyes:      Pupils: Pupils are equal, round, and reactive to light.   Cardiovascular:      Rate and Rhythm: Normal rate.      Pulses:           Dorsalis pedis pulses are 1+ on the right side and 1+ on the left side.        Posterior tibial pulses are 1+ on the right side and 1+ on the left side.   Pulmonary:      Effort: Pulmonary effort is normal.   Musculoskeletal:         General: Tenderness present.      Cervical back: Normal range of motion.      Comments: Moderate pain  with palpation plantar medial insertion of left heel plantar fascia.    No edema or erythema noted.  No pain with subtalar joint or ankle joint range of motion.   Feet:      Right foot:      Protective Sensation: 10 sites tested.  10 sites sensed.      Skin integrity: Skin integrity normal.      Toenail Condition: Right toenails are normal.      Left foot:      Protective Sensation: 10 sites tested.  10 sites sensed.      Skin integrity: Skin integrity normal.      Toenail Condition: Left toenails are normal.   Skin:     General: Skin is warm and dry.      Capillary Refill: Capillary refill takes 2 to 3 seconds.   Neurological:      General: No focal deficit present.      Mental Status: She is alert.   Psychiatric:         Mood and Affect: Mood normal.

## 2024-03-08 DIAGNOSIS — E78.2 MIXED HYPERLIPIDEMIA: ICD-10-CM

## 2024-03-08 RX ORDER — ATORVASTATIN CALCIUM 20 MG/1
20 TABLET, FILM COATED ORAL DAILY
Qty: 30 TABLET | Refills: 5 | Status: SHIPPED | OUTPATIENT
Start: 2024-03-08

## 2024-03-22 ENCOUNTER — APPOINTMENT (OUTPATIENT)
Dept: LAB | Facility: HOSPITAL | Age: 54
End: 2024-03-22

## 2024-03-22 DIAGNOSIS — Z00.6 ENCOUNTER FOR EXAMINATION FOR NORMAL COMPARISON OR CONTROL IN CLINICAL RESEARCH PROGRAM: ICD-10-CM

## 2024-03-22 PROCEDURE — 36415 COLL VENOUS BLD VENIPUNCTURE: CPT

## 2024-03-26 DIAGNOSIS — G47.00 INSOMNIA, UNSPECIFIED TYPE: ICD-10-CM

## 2024-03-26 RX ORDER — TRAZODONE HYDROCHLORIDE 100 MG/1
100 TABLET ORAL
Qty: 90 TABLET | Refills: 0 | Status: SHIPPED | OUTPATIENT
Start: 2024-03-26

## 2024-04-05 DIAGNOSIS — R05.9 COUGH: ICD-10-CM

## 2024-04-08 RX ORDER — ALBUTEROL SULFATE 90 UG/1
2 AEROSOL, METERED RESPIRATORY (INHALATION) EVERY 4 HOURS PRN
Qty: 18 G | Refills: 0 | Status: SHIPPED | OUTPATIENT
Start: 2024-04-08

## 2024-04-12 LAB
APOB+LDLR+PCSK9 GENE MUT ANL BLD/T: NOT DETECTED
BRCA1+BRCA2 DEL+DUP + FULL MUT ANL BLD/T: NOT DETECTED
MLH1+MSH2+MSH6+PMS2 GN DEL+DUP+FUL M: NOT DETECTED

## 2024-04-17 ENCOUNTER — HOSPITAL ENCOUNTER (OUTPATIENT)
Dept: MAMMOGRAPHY | Facility: IMAGING CENTER | Age: 54
Discharge: HOME | End: 2024-04-17
Payer: COMMERCIAL

## 2024-04-17 VITALS — WEIGHT: 185 LBS | HEIGHT: 66 IN | BODY MASS INDEX: 29.73 KG/M2

## 2024-04-17 DIAGNOSIS — Z12.31 ENCOUNTER FOR SCREENING MAMMOGRAM FOR BREAST CANCER: ICD-10-CM

## 2024-04-17 PROCEDURE — 77063 BREAST TOMOSYNTHESIS BI: CPT

## 2024-04-17 PROCEDURE — 77067 SCR MAMMO BI INCL CAD: CPT

## 2024-04-25 ENCOUNTER — OFFICE VISIT (OUTPATIENT)
Dept: PODIATRY | Facility: CLINIC | Age: 54
End: 2024-04-25
Payer: COMMERCIAL

## 2024-04-25 VITALS
SYSTOLIC BLOOD PRESSURE: 118 MMHG | HEIGHT: 66 IN | BODY MASS INDEX: 32.3 KG/M2 | WEIGHT: 201 LBS | HEART RATE: 81 BPM | DIASTOLIC BLOOD PRESSURE: 71 MMHG

## 2024-04-25 DIAGNOSIS — M79.672 PAIN OF LEFT HEEL: ICD-10-CM

## 2024-04-25 DIAGNOSIS — M72.2 PLANTAR FASCIITIS OF LEFT FOOT: Primary | ICD-10-CM

## 2024-04-25 PROCEDURE — 99213 OFFICE O/P EST LOW 20 MIN: CPT | Performed by: PODIATRIST

## 2024-04-25 NOTE — PROGRESS NOTES
Assessment/Plan:   Overall excellent progress noted with near resolution of left heel pain.  Continue with stretching icing on a daily basis with proper supportive shoe gear.  OTC anti-inflammatories as needed  Follow-up in approximately 4 months.       Diagnoses and all orders for this visit:    Plantar fasciitis of left foot    Pain of left heel          Subjective:     Patient ID: Lucinda Dumont is a 53 y.o. female.    4/25/2024: 53-year-old female seen today for follow-up left heel pain.  Reports she is doing well with 85% resolution of her pain and discomfort.  She is now walking 7 to 8 miles a day which equates to around 17,000 steps.  Pleased with progress.    2/27/2024: 53-year-old female seen today for follow-up chronic left heel pain.  Reports she has been stretching icing and doing everything she should but she has still only improved a very slight amount and total now she would approximate maybe 30% improved since her initial presentation.    1/2/2024: 53-year-old female seen today for follow-up painful left heel.  Patient reports only maybe a 15% improvement at best.  Does confess that she has not been stretching as instructed.    11/21/2023: 53-year-old female presents with chief complaint of painful left heel.  Patient reports 2 years ago having plantar fasciitis on her right side which got better then the left started having symptoms after she turned quick in the store and this exacerbated the pain.        Review of Systems   Constitutional: Negative.    HENT: Negative.     Eyes: Negative.    Respiratory: Negative.     Cardiovascular: Negative.    Gastrointestinal: Negative.    Endocrine: Negative.    Genitourinary: Negative.    Musculoskeletal:         Diminished pain left heel.   Skin: Negative.    Allergic/Immunologic: Negative.    Neurological: Negative.    Hematological: Negative.    Psychiatric/Behavioral: Negative.           Objective:     Physical Exam  Constitutional:       Appearance: Normal  appearance.   HENT:      Head: Normocephalic.      Right Ear: External ear normal.      Left Ear: External ear normal.   Eyes:      Pupils: Pupils are equal, round, and reactive to light.   Cardiovascular:      Rate and Rhythm: Normal rate.      Pulses: Normal pulses.   Pulmonary:      Effort: Pulmonary effort is normal.   Musculoskeletal:         General: Tenderness present.      Cervical back: Normal range of motion.      Comments: Left foot: Scant tenderness with palpation plantar fascia medial insertion of left heel, no edema or erythema noted.  No pain with subtalar joint or ankle joint range of motion.   Feet:      Right foot:      Protective Sensation: 10 sites tested.  10 sites sensed.      Skin integrity: Skin integrity normal.      Left foot:      Protective Sensation: 10 sites tested.  10 sites sensed.      Skin integrity: Skin integrity normal.   Skin:     General: Skin is warm and dry.      Capillary Refill: Capillary refill takes 2 to 3 seconds.   Neurological:      General: No focal deficit present.      Mental Status: She is alert.   Psychiatric:         Mood and Affect: Mood normal.

## 2024-05-21 ENCOUNTER — PATIENT MESSAGE (OUTPATIENT)
Dept: FAMILY MEDICINE CLINIC | Facility: CLINIC | Age: 54
End: 2024-05-21

## 2024-05-22 NOTE — PATIENT COMMUNICATION
"Received patient message from patient that said \"please consider changing med refills from 30 to 90 days.\"  Please call or message patient to schedule as  she is overdue for an appt.   (Last seen in October. )  Not sure if she needs refills now, but will only fill for #30 days until she can be seen in office   "

## 2024-05-28 ENCOUNTER — OFFICE VISIT (OUTPATIENT)
Dept: URGENT CARE | Facility: CLINIC | Age: 54
End: 2024-05-28
Payer: COMMERCIAL

## 2024-05-28 ENCOUNTER — NURSE TRIAGE (OUTPATIENT)
Age: 54
End: 2024-05-28

## 2024-05-28 VITALS
HEIGHT: 66 IN | RESPIRATION RATE: 16 BRPM | OXYGEN SATURATION: 99 % | DIASTOLIC BLOOD PRESSURE: 80 MMHG | BODY MASS INDEX: 32.94 KG/M2 | TEMPERATURE: 99.3 F | HEART RATE: 106 BPM | SYSTOLIC BLOOD PRESSURE: 138 MMHG

## 2024-05-28 DIAGNOSIS — K52.9 NONINFECTIOUS GASTROENTERITIS, UNSPECIFIED TYPE: Primary | ICD-10-CM

## 2024-05-28 PROCEDURE — 99213 OFFICE O/P EST LOW 20 MIN: CPT | Performed by: PHYSICIAN ASSISTANT

## 2024-05-28 NOTE — TELEPHONE ENCOUNTER
"Pt called with c/o n/v/d since last Friday morning. She feels it's food poisoning after eating an organic apple that didn't taste right. Pt having up to 8 watery stools a day. Last vomited late last night. Having difficulty consuming solids and fluids. Abdominal pain described as intermittent cramping with bowel movements. Triage completed. No available appointments in the office for 2 days. Pt was encouraged to go to urgent care today. Pt is agreeable with plan and will go to [facility] [place]. Pt denies further needs at this time. Skip the wait reviewed.        Reason for Disposition   MODERATE diarrhea (e.g., 4-6 times / day more than normal) and present > 48 hours (2 days)    Answer Assessment - Initial Assessment Questions  1. DIARRHEA SEVERITY: \"How bad is the diarrhea?\" \"How many extra stools have you had in the past 24 hours than normal?\"     - NO DIARRHEA (SCALE 0)    - MILD (SCALE 1-3): Few loose or mushy BMs; increase of 1-3 stools over normal daily number of stools; mild increase in ostomy output.    -  MODERATE (SCALE 4-7): Increase of 4-6 stools daily over normal; moderate increase in ostomy output.  * SEVERE (SCALE 8-10; OR 'WORST POSSIBLE'): Increase of 7 or more stools daily over normal; moderate increase in ostomy output; incontinence.      Moderate to severe  2. ONSET: \"When did the diarrhea begin?\"       Friday Morning  3. BM CONSISTENCY: \"How loose or watery is the diarrhea?\"       Watery brown yellowish  4. VOMITING: \"Are you also vomiting?\" If Yes, ask: \"How many times in the past 24 hours?\"       Last vomited last night at 1100pm  5. ABDOMINAL PAIN: \"Are you having any abdominal pain?\" If Yes, ask: \"What does it feel like?\" (e.g., crampy, dull, intermittent, constant)       Cramping with bowel movements  6. ABDOMINAL PAIN SEVERITY: If present, ask: \"How bad is the pain?\"  (e.g., Scale 1-10; mild, moderate, or severe)    - MILD (1-3): doesn't interfere with normal activities, " "abdomen soft and not tender to touch     - MODERATE (4-7): interferes with normal activities or awakens from sleep, tender to touch     - SEVERE (8-10): excruciating pain, doubled over, unable to do any normal activities        4/10  7. ORAL INTAKE: If vomiting, \"Have you been able to drink liquids?\" \"How much fluids have you had in the past 24 hours?\"      Hard for Pt to eat or drink anything.   8. HYDRATION: \"Any signs of dehydration?\" (e.g., dry mouth [not just dry lips], too weak to stand, dizziness, new weight loss) \"When did you last urinate?\"      Lost 12 lbs , able to void not often  9. EXPOSURE: \"Have you traveled to a foreign country recently?\" \"Have you been exposed to anyone with diarrhea?\" \"Could you have eaten any food that was spoiled?\"      Thinks food poisoning from an apple that did not taste good, organic from [employer]  10. ANTIBIOTIC USE: \"Are you taking antibiotics now or have you taken antibiotics in the past 2 months?\"        denies  11. OTHER SYMPTOMS: \"Do you have any other symptoms?\" (e.g., fever, blood in stool)        denies    Protocols used: Diarrhea-ADULT-[place]    "

## 2024-05-28 NOTE — PROGRESS NOTES
[facility]        NAME: Lucinda Dumont is a 53 y.o. female  : 1970    MRN: [id number]  DATE: May 28, 2024  TIME: 3:02 PM    Assessment and Plan   Noninfectious gastroenteritis, unspecified type [K52.9]  1. Noninfectious gastroenteritis, unspecified type              Patient Instructions       Follow up with PCP in 3-5 days.  Proceed to  ER if symptoms worsen.    If tests have been performed at [facility], our office will contact you with results if changes need to be made to the care plan discussed with you at the visit.  [facility]    Chief Complaint     Chief Complaint   Patient presents with    Vomiting     Pt reports nausea, vomiting, and diarrhea with onset of symptoms Thursday. States fever Friday and Saturday. Not currently managing with otc medication. States vomiting subsided since last night. States three episodes of diarrhea today.         History of Present Illness       Patient presents with vomiting and diarrhea for the past 4 days.  Symptoms are improving.  No vomiting since yesterday.  Still with 3 episodes of diarrhea today but that has decreased.   Started after eating an apple. Had a fever the 1st two days but it resolved.   Denies abdominal pains, blood in stool, hematemesis.     Vomiting   Associated symptoms include diarrhea and a fever. Pertinent negatives include no abdominal pain.       Review of Systems   Review of Systems   Constitutional:  Positive for fever.   Gastrointestinal:  Positive for diarrhea, nausea and vomiting. Negative for abdominal pain, blood in stool and constipation.         Current Medications       Current Outpatient Medications:     albuterol (Ventolin HFA) 90 mcg/act inhaler, Inhale 2 puffs every 4 (four) hours as needed for wheezing or shortness of breath, Disp: 18 g, Rfl: 0    atorvastatin (LIPITOR) 20 mg tablet, Take 1 tablet by mouth once daily, Disp: 30 tablet, Rfl: 5    co-enzyme Q-10 100 mg capsule,  Take 100 mg by mouth daily, Disp: , Rfl:     lisinopril-hydrochlorothiazide (PRINZIDE,ZESTORETIC) 20-12.5 MG per tablet, Take 1 tablet by mouth daily, Disp: 90 tablet, Rfl: 1    omeprazole (PriLOSEC) 40 MG capsule, Take 1 capsule (40 mg total) by mouth daily, Disp: 90 capsule, Rfl: 1    saccharomyces boulardii (FLORASTOR) 250 mg capsule, Take 250 mg by mouth 2 (two) times a day, Disp: , Rfl:     sucralfate (CARAFATE) 1 g tablet, Take 1 tablet (1 g total) by mouth 4 (four) times a day (Patient taking differently: Take 1 g by mouth 4 (four) times a day as needed Reflux), Disp: 30 tablet, Rfl: 0    traZODone (DESYREL) 100 mg tablet, TAKE 1 TABLET BY MOUTH ONCE DAILY AT BEDTIME, Disp: 90 tablet, Rfl: 0    vitamin B-12 (VITAMIN B-12) 1,000 mcg tablet, Take 1,000 mcg by mouth daily, Disp: , Rfl:     meloxicam (Mobic) 15 mg tablet, Take 1 tablet (15 mg total) by mouth daily, Disp: 30 tablet, Rfl: 0    St Johns Wort 300 MG TABS, Take 300 mg by mouth daily in the early morning (Patient not taking: Reported on 2024), Disp: , Rfl:     Current Allergies     Allergies as of 2024    (No Known Allergies)            The following portions of the patient's history were reviewed and updated as appropriate: allergies, current medications, past family history, past medical history, past social history, past surgical history and problem list.     Past Medical History:   Diagnosis Date    Allergies     BRCA1 negative     GERD (gastroesophageal reflux disease)     Hyperlipidemia     Hypertension     Kidney stone     Plantar fasciitis        Past Surgical History:   Procedure Laterality Date    APPENDECTOMY      BREAST BIOPSY Left 2006    benign    KIDNEY STONE SURGERY         Family History   Problem Relation Age of Onset    Kidney failure Mother     Heart disease Father          from heart disease at age 70    BRCA1 Negative Sister     Breast cancer Sister 46    Breast cancer additional onset Sister 53     "Pancreatic cancer Sister 58    No Known Problems Sister     No Known Problems Sister     No Known Problems Sister     No Known Problems Sister     No Known Problems Sister     Breast cancer Brother 65    No Known Problems Maternal Aunt          Medications have been verified.        Objective   /80 (BP Location: Left arm, Patient Position: Sitting)   Pulse (!) 106   Temp 99.3 °F (37.4 °C)   Resp 16   Ht 5' 5.5\" (1.664 m)   SpO2 99%   BMI 32.94 kg/m²   No LMP recorded. Patient is postmenopausal.       Physical Exam     Physical Exam  Constitutional:       Appearance: Normal appearance.   HENT:      Mouth/Throat:      Mouth: Mucous membranes are moist.   Abdominal:      General: Abdomen is flat. Bowel sounds are normal.      Palpations: Abdomen is soft.      Tenderness: There is no abdominal tenderness. There is no right CVA tenderness, left CVA tenderness, guarding or rebound.   Skin:     General: Skin is warm and dry.   Neurological:      Mental Status: She is alert.   Psychiatric:         Mood and Affect: Mood normal.         Behavior: Behavior normal.                 "

## 2024-06-06 ENCOUNTER — OFFICE VISIT (OUTPATIENT)
Dept: FAMILY MEDICINE CLINIC | Facility: CLINIC | Age: 54
End: 2024-06-06
Payer: COMMERCIAL

## 2024-06-06 VITALS
BODY MASS INDEX: 30.53 KG/M2 | OXYGEN SATURATION: 100 % | SYSTOLIC BLOOD PRESSURE: 130 MMHG | HEART RATE: 87 BPM | DIASTOLIC BLOOD PRESSURE: 70 MMHG | HEIGHT: 66 IN | WEIGHT: 190 LBS | TEMPERATURE: 99.7 F | RESPIRATION RATE: 18 BRPM

## 2024-06-06 DIAGNOSIS — E78.2 MIXED HYPERLIPIDEMIA: ICD-10-CM

## 2024-06-06 DIAGNOSIS — G47.00 INSOMNIA, UNSPECIFIED TYPE: ICD-10-CM

## 2024-06-06 DIAGNOSIS — I10 ESSENTIAL HYPERTENSION: Primary | ICD-10-CM

## 2024-06-06 DIAGNOSIS — K21.9 GASTROESOPHAGEAL REFLUX DISEASE, UNSPECIFIED WHETHER ESOPHAGITIS PRESENT: ICD-10-CM

## 2024-06-06 DIAGNOSIS — R19.7 DIARRHEA, UNSPECIFIED TYPE: ICD-10-CM

## 2024-06-06 DIAGNOSIS — R79.89 LOW TSH LEVEL: ICD-10-CM

## 2024-06-06 PROCEDURE — 99214 OFFICE O/P EST MOD 30 MIN: CPT | Performed by: FAMILY MEDICINE

## 2024-06-06 NOTE — ASSESSMENT & PLAN NOTE
Patient taking lisinopril-hctz and tolerating well.   Lab Results   Component Value Date    SODIUM 138 11/03/2023    K 4.1 11/03/2023     11/03/2023    CO2 25 11/03/2023    AGAP 10 12/31/2019    BUN 14 11/03/2023    CREATININE 0.83 11/03/2023    GLUC 94 11/03/2023    CALCIUM 9.0 12/31/2019    AST 22 11/03/2023    ALT 18 11/03/2023    ALKPHOS 85 12/31/2019    TP 6.9 11/03/2023    TBILI 0.5 11/03/2023    EGFR 83 11/03/2023     Will check labs.

## 2024-06-06 NOTE — ASSESSMENT & PLAN NOTE
"On atorvastatin  Lab Results   Component Value Date    CHOLESTEROL 199 11/03/2023     Lab Results   Component Value Date    HDL 55 11/03/2023     Lab Results   Component Value Date    TRIG 154 (H) 11/03/2023     No results found for: \"NONHDLC\"  Due for labs. Ordered today  "

## 2024-06-06 NOTE — PROGRESS NOTES
"Ambulatory Visit  Name: Lucinda Dumont      : 1970      MRN: [id number]  Encounter Provider: Abimbola Montoya DO  Encounter Date: 2024   Encounter department: [facility]    Assessment & Plan   1. Essential hypertension  Assessment & Plan:  Patient taking lisinopril-hctz and tolerating well.   Lab Results   Component Value Date    SODIUM 138 2023    K 4.1 2023     2023    CO2 25 2023    AGAP 10 2019    BUN 14 2023    CREATININE 0.83 2023    GLUC 94 2023    CALCIUM 9.0 2019    AST 22 2023    ALT 18 2023    ALKPHOS 85 2019    TP 6.9 2023    TBILI 0.5 2023    EGFR 83 2023     Will check labs.   Orders:  -     Comprehensive metabolic panel  2. Mixed hyperlipidemia  Assessment & Plan:  On atorvastatin  Lab Results   Component Value Date    CHOLESTEROL 199 2023     Lab Results   Component Value Date    HDL 55 2023     Lab Results   Component Value Date    TRIG 154 (H) 2023     No results found for: \"NONHDLC\"  Due for labs. Ordered today  Orders:  -     Comprehensive metabolic panel  -     Lipid panel  3. Gastroesophageal reflux disease, unspecified whether esophagitis present  Assessment & Plan:  Stable on omeprazole and carafate prn  4. Insomnia, unspecified type  Assessment & Plan:  Taking and tolerating trazodone.   Works well for her  5. Diarrhea, unspecified type  -     Ova and parasite examination  -     TSH, 3rd generation with Free T4 reflex  -     CBC and differential  -     Stool culture  -     Clostridium difficile toxin by PCR with EIA         History of Present Illness     HPI    Patient is a 53 year old female with hypertension, hyperlipemia, GERD, insomnia and asthma being seen today for routine f/u visit.     According to patient, she had pap through a gyn in [facility]) in . Never received report.     Seen in urgent care on 24 for \"viral " "gastroenteritis\". She seems to think she had food poisoning from eating an apple. When she was seen in urgent care she was having both nausea and vomiting along with diarrhea. The nausea and vomiting have resolved but still getting diarrhea. She is having 6 loose stools a day. Has even woken her up from sleep at times.  No abdominal pain. No blood in stool. Very tired from all the diarrhea she has had. Taking imodium.       Review of Systems  Past Medical History:   Diagnosis Date   • Allergies    • BRCA1 negative    • GERD (gastroesophageal reflux disease)    • Hyperlipidemia    • Hypertension    • Kidney stone    • Plantar fasciitis      Past Surgical History:   Procedure Laterality Date   • APPENDECTOMY     • BREAST BIOPSY Left     benign   • KIDNEY STONE SURGERY       Family History   Problem Relation Age of Onset   • Kidney failure Mother    • Heart disease Father          from heart disease at age 70   • BRCA1 Negative Sister    • Breast cancer Sister 46   • Breast cancer additional onset Sister 53   • Pancreatic cancer Sister 58   • No Known Problems Sister    • No Known Problems Sister    • No Known Problems Sister    • No Known Problems Sister    • No Known Problems Sister    • Breast cancer Brother 65   • No Known Problems Maternal Aunt      Social History     Tobacco Use   • Smoking status: Never   • Smokeless tobacco: Never   Vaping Use   • Vaping status: Never Used   Substance and Sexual Activity   • Alcohol use: Yes     Alcohol/week: 2.0 standard drinks of alcohol     Types: 2 Standard drinks or equivalent per week     Comment: social   • Drug use: Never   • Sexual activity: Not Currently     Partners: Male     Birth control/protection: None     Current Outpatient Medications on File Prior to Visit   Medication Sig   • albuterol (Ventolin HFA) 90 mcg/act inhaler Inhale 2 puffs every 4 (four) hours as needed for wheezing or shortness of breath   • atorvastatin (LIPITOR) 20 mg tablet " "Take 1 tablet by mouth once daily   • co-enzyme Q-10 100 mg capsule Take 100 mg by mouth daily   • lisinopril-hydrochlorothiazide (PRINZIDE,ZESTORETIC) 20-12.5 MG per tablet Take 1 tablet by mouth daily   • omeprazole (PriLOSEC) 40 MG capsule Take 1 capsule (40 mg total) by mouth daily   • saccharomyces boulardii (FLORASTOR) 250 mg capsule Take 250 mg by mouth 2 (two) times a day   • sucralfate (CARAFATE) 1 g tablet Take 1 tablet (1 g total) by mouth 4 (four) times a day (Patient taking differently: Take 1 g by mouth 4 (four) times a day as needed Reflux)   • traZODone (DESYREL) 100 mg tablet TAKE 1 TABLET BY MOUTH ONCE DAILY AT BEDTIME   • vitamin B-12 (VITAMIN B-12) 1,000 mcg tablet Take 1,000 mcg by mouth daily     No Known Allergies  Immunization History   Administered Date(s) Administered   • COVID-19 PFIZER VACCINE 0.3 ML IM 04/15/2021, 05/10/2021, 11/27/2021   • COVID-19 Pfizer Vac BIVALENT Isra-sucrose 12 Yr+ IM 09/20/2022   • Influenza, injectable, quadrivalent, preservative free 0.5 mL 10/20/2023   • Pneumococcal Conjugate Vaccine 20-valent (Pcv20), Polysace 10/20/2023     Objective     /70   Pulse 87   Temp 99.7 °F (37.6 °C) (Tympanic)   Resp 18   Ht 5' 5.5\" (1.664 m)   Wt 86.2 kg (190 lb)   SpO2 100%   BMI 31.14 kg/m²     Physical Exam  Vitals and nursing note reviewed.   Constitutional:       General: She is not in acute distress.     Appearance: Normal appearance. She is not ill-appearing, toxic-appearing or diaphoretic.   Eyes:      Conjunctiva/sclera: Conjunctivae normal.   Cardiovascular:      Rate and Rhythm: Normal rate and regular rhythm.      Heart sounds: No murmur heard.  Pulmonary:      Effort: Pulmonary effort is normal.      Breath sounds: Normal breath sounds.   Abdominal:      General: Abdomen is flat. Bowel sounds are normal. There is no distension.      Palpations: Abdomen is soft. There is no mass.      Tenderness: There is no abdominal tenderness.   Musculoskeletal:      " Cervical back: Normal range of motion and neck supple.      Right lower leg: No edema.      Left lower leg: No edema.   Lymphadenopathy:      Cervical: No cervical adenopathy.   Skin:     General: Skin is warm and dry.   Neurological:      Mental Status: She is alert.   Psychiatric:         Mood and Affect: Mood normal.       Administrative Statements

## 2024-06-07 ENCOUNTER — TELEPHONE (OUTPATIENT)
Dept: ADMINISTRATIVE | Facility: OTHER | Age: 54
End: 2024-06-07

## 2024-06-07 NOTE — TELEPHONE ENCOUNTER
----- Message from Abimbola Montoya DO sent at 6/6/2024  5:05 PM EDT -----  Regarding: care gap request  11/18/20 3:57 PM    Hello, our patient attached above has had Pap Smear (HPV) aka Cervical Cancer Screening completed/performed. Please assist in updating the patient chart by making an External outreach to [facility] facility located in [place]. The date of service is 2021.    Thank you,  Abimbola Montoya DO  [facility]

## 2024-06-07 NOTE — LETTER
STAT      Procedure Request Form: Cervical Cancer Screening      Date Requested: 24  Patient: Lucinda Dumont  Patient : 1970   Referring Provider: Abimbola Montoya, DO        Date of Procedure ______________________________       The above patient has informed us that they have completed their   most recent Cervical Cancer Screening at your facility. Please complete   this form and attach all corresponding procedure reports/results.    Comments __________________________________________________________  ____________________________________________________________________  ____________________________________________________________________  ____________________________________________________________________    Facility Completing Procedure _________________________________________    Form Completed By (print name) _______________________________________      Signature __________________________________________________________        These reports are needed for  compliance.    Please fax this completed form and a copy of the procedure report to our office located at [address] [place] [address] as soon as possible to Fax [phone] attention Bernice: Phone [phone]    We thank you for your assistance in treating our mutual patient.

## 2024-06-07 NOTE — LETTER
Procedure Request Form: Cervical Cancer Screening      Date Requested: 06/10/24  Patient: Lucinda Dumont  Patient : 1970   Referring Provider: Abimbola Montoya, DO        Date of Procedure ______________________________       The above patient has informed us that they have completed their   most recent Cervical Cancer Screening at your facility. Please complete   this form and attach all corresponding procedure reports/results.    Comments __________________________________________________________  ____________________________________________________________________  ____________________________________________________________________  ____________________________________________________________________    Facility Completing Procedure _________________________________________    Form Completed By (print name) _______________________________________      Signature __________________________________________________________        These reports are needed for  compliance.    Please fax this completed form and a copy of the procedure report to our office located at [address] [place] [address] as soon as possible to Fax [phone] attention Bernice: Phone [phone]    We thank you for your assistance in treating our mutual patient.

## 2024-06-07 NOTE — LETTER
Procedure Request Form: Cervical Cancer Screening      Date Requested: 24  Patient: Lucinda Dumont  Patient : 1970   Referring Provider: Abimbola Montoya, DO        Date of Procedure ______________________________       The above patient has informed us that they have completed their   most recent Cervical Cancer Screening at your facility. Please complete   this form and attach all corresponding procedure reports/results.    Comments __________________________________________________________  ____________________________________________________________________  ____________________________________________________________________  ____________________________________________________________________    Facility Completing Procedure _________________________________________    Form Completed By (print name) _______________________________________      Signature __________________________________________________________        These reports are needed for  compliance.    Please fax this completed form and a copy of the procedure report to our office located at [address] [place] [address] as soon as possible to Fax [phone] attention Bernice: Phone [phone]    We thank you for your assistance in treating our mutual patient.

## 2024-06-10 NOTE — TELEPHONE ENCOUNTER
Upon review of the In Basket request and the patient's chart, initial outreach has been made via fax to facility. Please see Contacts section for details.     Thank you  Bernice Briseno MA

## 2024-06-12 NOTE — TELEPHONE ENCOUNTER
As a follow-up, a second attempt has been made for outreach via fax to facility. Please see Contacts section for details.    Thank you  Bernice Briseno MA

## 2024-06-18 DIAGNOSIS — G47.00 INSOMNIA, UNSPECIFIED TYPE: ICD-10-CM

## 2024-06-18 RX ORDER — TRAZODONE HYDROCHLORIDE 100 MG/1
100 TABLET ORAL
Qty: 90 TABLET | Refills: 1 | Status: SHIPPED | OUTPATIENT
Start: 2024-06-18

## 2024-06-20 PROBLEM — R79.89 LOW TSH LEVEL: Status: ACTIVE | Noted: 2024-06-20

## 2024-06-20 LAB
ALBUMIN SERPL-MCNC: 4.3 G/DL (ref 3.8–4.9)
ALBUMIN/GLOB SERPL: 1.9 {RATIO}
ALP SERPL-CCNC: 95 IU/L (ref 44–121)
ALT SERPL-CCNC: 29 IU/L (ref 0–32)
AST SERPL-CCNC: 30 IU/L (ref 0–40)
BASOPHILS # BLD AUTO: 0 X10E3/UL (ref 0–0.2)
BASOPHILS NFR BLD AUTO: 1 %
BILIRUB SERPL-MCNC: 0.6 MG/DL (ref 0–1.2)
BUN SERPL-MCNC: 10 MG/DL (ref 6–24)
BUN/CREAT SERPL: 13 (ref 9–23)
CALCIUM SERPL-MCNC: 9.5 MG/DL (ref 8.7–10.2)
CHLORIDE SERPL-SCNC: 104 MMOL/L (ref 96–106)
CHOLEST SERPL-MCNC: 140 MG/DL (ref 100–199)
CHOLEST/HDLC SERPL: 3.2 RATIO (ref 0–4.4)
CO2 SERPL-SCNC: 24 MMOL/L (ref 20–29)
CREAT SERPL-MCNC: 0.75 MG/DL (ref 0.57–1)
EGFR: 94 ML/MIN/1.73
EOSINOPHIL # BLD AUTO: 0.2 X10E3/UL (ref 0–0.4)
EOSINOPHIL NFR BLD AUTO: 4 %
ERYTHROCYTE [DISTWIDTH] IN BLOOD BY AUTOMATED COUNT: 12.9 % (ref 11.7–15.4)
GLOBULIN SER-MCNC: 2.3 G/DL (ref 1.5–4.5)
GLUCOSE SERPL-MCNC: 102 MG/DL (ref 70–99)
HCT VFR BLD AUTO: 39.6 % (ref 34–46.6)
HDLC SERPL-MCNC: 44 MG/DL
HGB BLD-MCNC: 12.9 G/DL (ref 11.1–15.9)
IMM GRANULOCYTES # BLD: 0 X10E3/UL (ref 0–0.1)
IMM GRANULOCYTES NFR BLD: 0 %
LDLC SERPL CALC-MCNC: 78 MG/DL (ref 0–99)
LYMPHOCYTES # BLD AUTO: 1.6 X10E3/UL (ref 0.7–3.1)
LYMPHOCYTES NFR BLD AUTO: 37 %
MCH RBC QN AUTO: 30.9 PG (ref 26.6–33)
MCHC RBC AUTO-ENTMCNC: 32.6 G/DL (ref 31.5–35.7)
MCV RBC AUTO: 95 FL (ref 79–97)
MONOCYTES # BLD AUTO: 0.5 X10E3/UL (ref 0.1–0.9)
MONOCYTES NFR BLD AUTO: 13 %
NEUTROPHILS # BLD AUTO: 1.9 X10E3/UL (ref 1.4–7)
NEUTROPHILS NFR BLD AUTO: 45 %
PLATELET # BLD AUTO: 261 X10E3/UL (ref 150–450)
POTASSIUM SERPL-SCNC: 3.6 MMOL/L (ref 3.5–5.2)
PROT SERPL-MCNC: 6.6 G/DL (ref 6–8.5)
RBC # BLD AUTO: 4.17 X10E6/UL (ref 3.77–5.28)
SL AMB VLDL CHOLESTEROL CALC: 18 MG/DL (ref 5–40)
SODIUM SERPL-SCNC: 142 MMOL/L (ref 134–144)
TRIGL SERPL-MCNC: 95 MG/DL (ref 0–149)
TSH SERPL DL<=0.005 MIU/L-ACNC: 0.3 UIU/ML (ref 0.45–4.5)
WBC # BLD AUTO: 4.2 X10E3/UL (ref 3.4–10.8)

## 2024-06-20 NOTE — TELEPHONE ENCOUNTER
As a final attempt, a third outreach has been made via telephone call to facility. The outcome of the telephone call was a fax request form to be sent to Office/Facility. Please see Contacts section for details. This encounter will be closed and completed by end of day. Should we receive the requested information because of previous outreach attempts, the requested patient's chart will be updated appropriately.     Thank you  Bernice Briseno MA

## 2024-06-24 DIAGNOSIS — R19.7 DIARRHEA, UNSPECIFIED TYPE: Primary | ICD-10-CM

## 2024-06-24 NOTE — TELEPHONE ENCOUNTER
Upon review of the In Basket request we were able to locate, review, and update the patient chart as requested for Pap Smear (HPV) aka Cervical Cancer Screening.    Any additional questions or concerns should be emailed to the Practice Liaisons via the appropriate education email address, please do not reply via In Basket.    Thank you  Bernice Briseno MA   [facility]

## 2024-07-13 LAB
ENDOMYSIUM IGA SER QL: NEGATIVE
IGA SERPL-MCNC: 129 MG/DL (ref 87–352)
T4 FREE SERPL-MCNC: 1.55 NG/DL (ref 0.82–1.77)
TSH SERPL DL<=0.005 MIU/L-ACNC: 0.28 UIU/ML (ref 0.45–4.5)
TTG IGA SER-ACNC: <2 U/ML (ref 0–3)

## 2024-07-15 ENCOUNTER — TELEPHONE (OUTPATIENT)
Dept: FAMILY MEDICINE CLINIC | Facility: CLINIC | Age: 54
End: 2024-07-15

## 2024-07-15 DIAGNOSIS — E05.90 HYPERTHYROIDISM: Primary | ICD-10-CM

## 2024-07-15 NOTE — TELEPHONE ENCOUNTER
----- Message from Abimbola Montyoa DO sent at 7/15/2024 12:53 PM EDT -----  Let patient know that her thyroid remains overactive  I would like her to see the endocrinologist.   Referral placed

## 2024-08-01 DIAGNOSIS — K21.9 GASTROESOPHAGEAL REFLUX DISEASE, UNSPECIFIED WHETHER ESOPHAGITIS PRESENT: ICD-10-CM

## 2024-08-01 RX ORDER — OMEPRAZOLE 40 MG/1
40 CAPSULE, DELAYED RELEASE ORAL DAILY
Qty: 90 CAPSULE | Refills: 1 | Status: SHIPPED | OUTPATIENT
Start: 2024-08-01

## 2024-08-07 DIAGNOSIS — R05.9 COUGH: ICD-10-CM

## 2024-08-08 RX ORDER — ALBUTEROL SULFATE 90 UG/1
2 AEROSOL, METERED RESPIRATORY (INHALATION) EVERY 4 HOURS PRN
Qty: 18 G | Refills: 1 | Status: SHIPPED | OUTPATIENT
Start: 2024-08-08

## 2024-09-11 NOTE — PROGRESS NOTES
No chief complaint on file.     Referring Provider  Abimbola Montoya,   [address] [place] [id number]     History of Present Illness:   Lucinda Dumont is a 53 y.o. female with low TSH seen on routine testing. Patient reports she has known low TSH years ago and PCP keeps routine testing and since TSH was persistantly low referred to us      S/sx: reports palpitations but only when she exercises, Denies tremor, changes in hair/skin/nails, changes in menstrual cycle, diarrhea, blurry vision or double vision. Does report some dysphagia but stable and not getting worse and feels this is d/t her GERD  Denies any biotin use, illness, or recent contrast study. Did have food poisoning few months ago     TSH  TSH 2.9,  TSH 0.285, T4 1.55    Fhx:   thyroid disorder- Neg     Patient Active Problem List   Diagnosis    Essential hypertension    GERD (gastroesophageal reflux disease)    Mixed hyperlipidemia    Mild intermittent asthma    Insomnia    Low TSH level      Past Medical History:   Diagnosis Date    Allergies     BRCA1 negative     GERD (gastroesophageal reflux disease)     Hyperlipidemia     Hypertension     Kidney stone     Plantar fasciitis       Past Surgical History:   Procedure Laterality Date    APPENDECTOMY      BREAST BIOPSY Left 2006    benign    KIDNEY STONE SURGERY        Family History   Problem Relation Age of Onset    Kidney failure Mother     Heart disease Father          from heart disease at age 70    BRCA1 Negative Sister     Breast cancer Sister 46    Breast cancer additional onset Sister 53    Pancreatic cancer Sister 58    No Known Problems Sister     No Known Problems Sister     No Known Problems Sister     No Known Problems Sister     No Known Problems Sister     Breast cancer Brother 65    No Known Problems Maternal Aunt      Social History     Tobacco Use    Smoking status: Never    Smokeless tobacco: Never   Substance Use Topics    Alcohol use: Yes      Alcohol/week: 2.0 standard drinks of alcohol     Types: 2 Standard drinks or equivalent per week     Comment: social     No Known Allergies      Current Outpatient Medications:     albuterol (Ventolin HFA) 90 mcg/act inhaler, Inhale 2 puffs every 4 (four) hours as needed for wheezing or shortness of breath, Disp: 18 g, Rfl: 1    atorvastatin (LIPITOR) 20 mg tablet, Take 1 tablet by mouth once daily, Disp: 30 tablet, Rfl: 5    co-enzyme Q-10 100 mg capsule, Take 100 mg by mouth daily, Disp: , Rfl:     lisinopril-hydrochlorothiazide (PRINZIDE,ZESTORETIC) 20-12.5 MG per tablet, Take 1 tablet by mouth daily, Disp: 90 tablet, Rfl: 1    omeprazole (PriLOSEC) 40 MG capsule, Take 1 capsule (40 mg total) by mouth daily, Disp: 90 capsule, Rfl: 1    saccharomyces boulardii (FLORASTOR) 250 mg capsule, Take 250 mg by mouth 2 (two) times a day, Disp: , Rfl:     sucralfate (CARAFATE) 1 g tablet, Take 1 tablet (1 g total) by mouth 4 (four) times a day (Patient taking differently: Take 1 g by mouth 4 (four) times a day as needed Reflux), Disp: 30 tablet, Rfl: 0    traZODone (DESYREL) 100 mg tablet, TAKE 1 TABLET BY MOUTH ONCE DAILY AT BEDTIME, Disp: 90 tablet, Rfl: 1    vitamin B-12 (VITAMIN B-12) 1,000 mcg tablet, Take 1,000 mcg by mouth daily, Disp: , Rfl:   Review of Systems   Constitutional:  Negative for fatigue and unexpected weight change.   HENT:  Negative for trouble swallowing and voice change.    Eyes:  Negative for photophobia and visual disturbance.   Respiratory:  Negative for choking and shortness of breath.    Gastrointestinal:  Negative for constipation and diarrhea.   Endocrine: Negative for cold intolerance and heat intolerance.   Musculoskeletal:  Negative for arthralgias and myalgias.   Skin:  Negative for rash.       Physical Exam:  There is no height or weight on file to calculate BMI.  There were no vitals taken for this visit.   Wt Readings from Last 3 Encounters:   06/06/24 86.2 kg (190 lb)   04/25/24 91.2  kg (201 lb)   04/17/24 83.9 kg (185 lb)       GEN: NAD  E/n/m nl facies, hearing intact bilat, tongue midline, lips nlEyes: no stare or proptosis, nl lids and conjunctiva, EOMI  Neck: trachea midline, thyroid NT to palpation, nl in size, possible left nodule, no cervical LAD  Resp: CTAB, good effort  Ab+BS  Neuro: no tremor, 2+ DTRs in BUE  MS: no c/c in digits, moves all 4 ext, nl muscle bulk, gait nl  Skin: warm and dry, no palmar erythema  Psych: nl mood and affect, no gross lapses in memory    DATA:  Labs:    Latest Reference Range & Units 11/03/23 09:42 06/14/24 09:48 07/12/24 10:16   Hemoglobin A1C 4.8 - 5.6 % 5.4     eAG, EST AVG Glucose mg/dL 108     CALCIUM 8.7 - 10.2 mg/dL 9.4 9.5    TSH, POC 0.450 - 4.500 uIU/mL 0.493 0.299 (L) 0.285 (L)   FREE T4 0.82 - 1.77 ng/dL   1.55   (L): Data is abnormally low      Radiology    Impression:  No diagnosis found.       Plan:    There are no diagnoses linked to this encounter.    Hyperthyroidism, likely due to subclinical:- given his left thyroid enlargement will obtain US thyroid for workup. Discussed clinically she does not have any symptoms concerning for overt hyperthyroidism, no cardiac or OP hx and TSH not persistantly <0.1 and hence Tx not needed. Continue to trend. Will check US now and Ab and TFT in 2 months. If remains stable repeat in 4 months. Discussed symptoms of hyperthyroidism which if she has them she will reach out to us for sooner lab check      Discussed with the patient and all questioned fully answered. She will call me if any problems arise.    RTC in 6 months     Jessica Cross MD

## 2024-09-12 ENCOUNTER — OFFICE VISIT (OUTPATIENT)
Dept: ENDOCRINOLOGY | Facility: HOSPITAL | Age: 54
End: 2024-09-12
Payer: COMMERCIAL

## 2024-09-12 VITALS
HEART RATE: 78 BPM | DIASTOLIC BLOOD PRESSURE: 80 MMHG | SYSTOLIC BLOOD PRESSURE: 122 MMHG | HEIGHT: 66 IN | BODY MASS INDEX: 29.41 KG/M2 | WEIGHT: 183 LBS | OXYGEN SATURATION: 98 %

## 2024-09-12 DIAGNOSIS — E05.90 HYPERTHYROIDISM: ICD-10-CM

## 2024-09-12 DIAGNOSIS — E05.90 SUBCLINICAL HYPERTHYROIDISM: Primary | ICD-10-CM

## 2024-09-12 DIAGNOSIS — E04.1 THYROID NODULE: ICD-10-CM

## 2024-09-12 PROCEDURE — 99244 OFF/OP CNSLTJ NEW/EST MOD 40: CPT | Performed by: STUDENT IN AN ORGANIZED HEALTH CARE EDUCATION/TRAINING PROGRAM

## 2024-10-02 ENCOUNTER — HOSPITAL ENCOUNTER (OUTPATIENT)
Dept: ULTRASOUND IMAGING | Facility: HOSPITAL | Age: 54
Discharge: HOME | End: 2024-10-02
Payer: COMMERCIAL

## 2024-10-02 DIAGNOSIS — E04.1 THYROID NODULE: ICD-10-CM

## 2024-10-02 PROCEDURE — 76536 US EXAM OF HEAD AND NECK: CPT

## 2024-11-18 ENCOUNTER — RESULTS FOLLOW-UP (OUTPATIENT)
Dept: ENDOCRINOLOGY | Facility: CLINIC | Age: 54
End: 2024-11-18

## 2024-11-18 LAB
T3FREE SERPL-MCNC: 3.4 PG/ML (ref 2–4.4)
T4 FREE SERPL-MCNC: 1.48 NG/DL (ref 0.82–1.77)
TSH RECEP AB SER-ACNC: <1.1 IU/L (ref 0–1.75)
TSH SERPL DL<=0.005 MIU/L-ACNC: 0.54 UIU/ML (ref 0.45–4.5)
TSI SER-ACNC: <0.1 IU/L (ref 0–0.55)

## 2024-12-06 NOTE — ASSESSMENT & PLAN NOTE
Stable.   She is requesting decrease in her dose from 100 mg at hs  Rx sent.   Orders:    traZODone (DESYREL) 50 mg tablet; Take 1 tablet (50 mg total) by mouth daily at bedtime

## 2024-12-06 NOTE — PROGRESS NOTES
"Adult Annual Physical  Name: Lucinda Dumont      : 1970      MRN: [id number]  Encounter Provider: Abimbola Montoya DO  Encounter Date: 2024   Encounter department: [facility]    Assessment & Plan  Annual physical exam  Discussed diet and exercise  Mammogram up to date  Colon cancer screen up to date  Pap up to date  Had flu vaccine this season  Got her first shingrix       Essential hypertension  Bp at goal on current meds  Check cmp  Rx refilled.   Orders:  •  Comprehensive metabolic panel  •  lisinopril-hydrochlorothiazide (PRINZIDE,ZESTORETIC) 20-12.5 MG per tablet; Take 1 tablet by mouth daily    Mild intermittent asthma, unspecified whether complicated  Stable on albuterol       Gastroesophageal reflux disease, unspecified whether esophagitis present  She decreased dose of omeprazole on her own from 40mg to 20 mg and is doing well   Requests refill on this.   Orders:  •  omeprazole (PriLOSEC) 20 mg delayed release capsule; Take 1 capsule (20 mg total) by mouth daily before breakfast    Insomnia, unspecified type  Stable.   She is requesting decrease in her dose from 100 mg at hs  Rx sent.   Orders:  •  traZODone (DESYREL) 50 mg tablet; Take 1 tablet (50 mg total) by mouth daily at bedtime    Mixed hyperlipidemia  Lab Results   Component Value Date    CHOLESTEROL 140 2024    CHOLESTEROL 199 2023     Lab Results   Component Value Date    HDL 44 2024    HDL 55 2023     Lab Results   Component Value Date    TRIG 95 2024    TRIG 154 (H) 2023     No results found for: \"NONHDLC\"  She stopped her atorvastatin since I saw her last.   Check lipid panel and call with results.   Orders:  •  Comprehensive metabolic panel  •  Lipid panel    Low TSH level  Lab Results   Component Value Date    TSH 0.544 11/15/2024            Screening for diabetes mellitus    Orders:  •  Hemoglobin A1C With EAG    Ganglion cyst of joint of finger of left " hand  Refer ortho hand  Orders:  •  Ambulatory Referral to Orthopedic Surgery; Future    Flank pain  Resolved.   ? Stone  UA in office today showed trace blood  Send urine for culture  Check KUB  Orders:  •  POCT urine dip  •  XR abdomen 1 view kub; Future    Microscopic hematuria    Orders:  •  XR abdomen 1 view kub; Future      Immunizations and preventive care screenings were discussed with patient today. Appropriate education was printed on patient's after visit summary.    Counseling:  Alcohol/drug use: discussed moderation in alcohol intake, the recommendations for healthy alcohol use, and avoidance of illicit drug use.  Dental Health: discussed importance of regular tooth brushing, flossing, and dental visits.  Injury prevention: discussed safety/seat belts, safety helmets, smoke detectors, carbon monoxide detectors, and smoking near bedding or upholstery.  Exercise: the importance of regular exercise/physical activity was discussed. Recommend exercise 3-5 times per week for at least 30 minutes.          History of Present Illness     Adult Annual Physical:  Patient presents for annual physical.     Diet and Physical Activity:  - Diet/Nutrition: well balanced diet.  - Exercise: walking and 5-7 times a week on average. walks 5 miles a day    Depression Screening:  - PHQ-2 Score: 0    General Health:  - Sleep: sleeps well.  - Hearing: normal hearing right ear and normal hearing left ear.  - Vision: no vision problems.  - Dental: regular dental visits.    /GYN Health:    - Menopause: postmenopausal.     Advanced Care Planning:  - Has an advanced directive?: no    - Has a durable medical POA?: no    - ACP document given to patient?: yes    Patient is a 54 year old female who is being seen today for annual physical and f/u on her hypertension, hyperlipidemia, asthma, GERD, and insomnia and review of some recent thyroid labs.     Taking lisinopril-hctz 20-12.5 mg daily for her hypertension. Last cmp was in June.    Previously on atorvastatin 20 mg for her hyperlipidemia. Last lipid panel in June. Stopped taking this med.     Taking trazodone for her insomnia. Wants to decrease dose to 50 mg    Has a cough for last couple of weeks. Finally getting better. No associated shortness of breath. Taking mucinex and just ran out.     Taking omeprazole 20 for her GERD and this is working so wants a rx for this since she bought otc on amaz[employer] rather than taking the 40 mg as prescribed. She is only using carafate prn.     Some right flank pain for last couple of weeks. Getting better and has no pain today. Was sharp and was waking her up at night. Could not get comfortable. She has no dysuria, hematuria. Does have history of kidney stones in her 20's.     Health Maintenance   Topic Date Due   • Hepatitis C Screening  Never done   • HIV Screening  Never done   • DTaP,Tdap,and Td Vaccines (1 - Tdap) Never done   • Annual Physical  10/20/2024   • COVID-19 Vaccine (5 - 2024-25 season) 03/09/2025 (Originally 9/1/2024)   • Zoster Vaccine (2 of 2) 01/10/2025   • Breast Cancer Screening: Mammogram  04/17/2025   • Depression Screening  12/09/2025   • Cervical Cancer Screening  04/12/2027   • Colorectal Cancer Screening  08/05/2029   • RSV Vaccine Age 60+ Years (1 - 1-dose 75+ series) 10/22/2043   • Pneumococcal Vaccine: Pediatrics (0 to 5 Years) and At-Risk Patients (6 to 64 Years)  Completed   • Influenza Vaccine  Completed   • RSV Vaccine age 0-20 Months  Aged Out   • HIB Vaccine  Aged Out   • IPV Vaccine  Aged Out   • Hepatitis A Vaccine  Aged Out   • Meningococcal ACWY Vaccine  Aged Out   • HPV Vaccine  Aged Out       Review of Systems  Medical History Reviewed by provider this encounter:  Tobacco  Allergies  Meds  Problems  Med Hx  Surg Hx  Fam Hx  Soc   Hx    .  Current Outpatient Medications on File Prior to Visit   Medication Sig Dispense Refill   • albuterol (Ventolin HFA) 90 mcg/act inhaler Inhale 2 puffs every 4 (four) hours as  needed for wheezing or shortness of breath 18 g 1   • saccharomyces boulardii (FLORASTOR) 250 mg capsule Take 250 mg by mouth 2 (two) times a day     • sucralfate (CARAFATE) 1 g tablet Take 1 tablet (1 g total) by mouth 4 (four) times a day (Patient taking differently: Take 1 g by mouth 4 (four) times a day as needed Reflux) 30 tablet 0   • vitamin B-12 (VITAMIN B-12) 1,000 mcg tablet Take 1,000 mcg by mouth daily     • [DISCONTINUED] lisinopril-hydrochlorothiazide (PRINZIDE,ZESTORETIC) 20-12.5 MG per tablet Take 1 tablet by mouth daily 90 tablet 1   • [DISCONTINUED] omeprazole (PriLOSEC) 40 MG capsule Take 1 capsule (40 mg total) by mouth daily 90 capsule 1   • [DISCONTINUED] traZODone (DESYREL) 100 mg tablet TAKE 1 TABLET BY MOUTH ONCE DAILY AT BEDTIME 90 tablet 1   • [DISCONTINUED] atorvastatin (LIPITOR) 20 mg tablet Take 1 tablet by mouth once daily 30 tablet 5   • [DISCONTINUED] co-enzyme Q-10 100 mg capsule Take 100 mg by mouth daily       No current facility-administered medications on file prior to visit.        Objective   There were no vitals taken for this visit.    Physical Exam  Vitals and nursing note reviewed.   Constitutional:       General: She is not in acute distress.     Appearance: Normal appearance. She is not ill-appearing, toxic-appearing or diaphoretic.   HENT:      Head: Normocephalic and atraumatic.      Right Ear: Tympanic membrane normal.      Left Ear: Tympanic membrane normal.      Nose: Nose normal.      Mouth/Throat:      Mouth: Mucous membranes are moist.   Eyes:      Extraocular Movements: Extraocular movements intact.      Conjunctiva/sclera: Conjunctivae normal.      Pupils: Pupils are equal, round, and reactive to light.   Cardiovascular:      Rate and Rhythm: Normal rate and regular rhythm.      Heart sounds: No murmur heard.  Pulmonary:      Effort: Pulmonary effort is normal.      Breath sounds: Normal breath sounds.   Abdominal:      General: Abdomen is flat. Bowel sounds  are normal.      Palpations: Abdomen is soft.      Tenderness: There is no abdominal tenderness. There is no right CVA tenderness or left CVA tenderness.   Musculoskeletal:      Cervical back: Normal range of motion and neck supple.      Right lower leg: No edema.      Left lower leg: No edema.      Comments: Left thumb with pea sized nodule on extensor surface of IP joint , tender; full flexion/extension   Lymphadenopathy:      Cervical: No cervical adenopathy.   Skin:     General: Skin is warm and dry.      Findings: No rash.   Neurological:      General: No focal deficit present.      Mental Status: She is alert and oriented to person, place, and time.   Psychiatric:         Mood and Affect: Mood normal.

## 2024-12-06 NOTE — ASSESSMENT & PLAN NOTE
Bp at goal on current meds  Check cmp  Rx refilled.   Orders:    Comprehensive metabolic panel    lisinopril-hydrochlorothiazide (PRINZIDE,ZESTORETIC) 20-12.5 MG per tablet; Take 1 tablet by mouth daily

## 2024-12-06 NOTE — ASSESSMENT & PLAN NOTE
"Lab Results   Component Value Date    CHOLESTEROL 140 06/14/2024    CHOLESTEROL 199 11/03/2023     Lab Results   Component Value Date    HDL 44 06/14/2024    HDL 55 11/03/2023     Lab Results   Component Value Date    TRIG 95 06/14/2024    TRIG 154 (H) 11/03/2023     No results found for: \"NONHDLC\"  She stopped her atorvastatin since I saw her last.   Check lipid panel and call with results.   Orders:    Comprehensive metabolic panel    Lipid panel    "

## 2024-12-06 NOTE — PATIENT INSTRUCTIONS
"Patient Education     Routine physical for adults   The Basics   Written by the doctors and editors at [facility], [place]   What is a physical? -- A physical is a routine visit, or \"check-up,\" with your doctor. You might also hear it called a \"wellness visit\" or \"preventive visit.\"  During each visit, the doctor will:   Ask about your physical and mental health   Ask about your habits, behaviors, and lifestyle   Do an exam   Give you vaccines if needed   Talk to you about any medicines you take   Give advice about your health   Answer your questions  Getting regular check-ups is an important part of taking care of your health. It can help your doctor find and treat any problems you have. But it's also important for preventing health problems.  A routine physical is different from a \"sick visit.\" A sick visit is when you see a doctor because of a health concern or problem. Since physicals are scheduled ahead of time, you can think about what you want to ask the doctor.  How often should I get a physical? -- It depends on your age and health. In general, for people age 21 years and older:   If you are younger than 50 years, you might be able to get a physical every 3 years.   If you are 50 years or older, your doctor might recommend a physical every year.  If you have an ongoing health condition, like diabetes or high blood pressure, your doctor will probably want to see you more often.  What happens during a physical? -- In general, each visit will include:   Physical exam - The doctor or nurse will check your height, weight, heart rate, and blood pressure. They will also look at your eyes and ears. They will ask about how you are feeling and whether you have any symptoms that bother you.   Medicines - It's a good idea to bring a list of all the medicines you take to each doctor visit. Your doctor will talk to you about your medicines and answer any questions. Tell them if you are having any side effects that bother you. You " "should also tell them if you are having trouble paying for any of your medicines.   Habits and behaviors - This includes:   Your diet   Your exercise habits   Whether you smoke, drink alcohol, or use drugs   Whether you are sexually active   Whether you feel safe at home  Your doctor will talk to you about things you can do to improve your health and lower your risk of health problems. They will also offer help and support. For example, if you want to quit smoking, they can give you advice and might prescribe medicines. If you want to improve your diet or get more physical activity, they can help you with this, too.   Lab tests, if needed - The tests you get will depend on your age and situation. For example, your doctor might want to check your:   Cholesterol   Blood sugar   Iron level   Vaccines - The recommended vaccines will depend on your age, health, and what vaccines you already had. Vaccines are very important because they can prevent certain serious or deadly infections.   Discussion of screening - \"Screening\" means checking for diseases or other health problems before they cause symptoms. Your doctor can recommend screening based on your age, risk, and preferences. This might include tests to check for:   Cancer, such as breast, prostate, cervical, ovarian, colorectal, prostate, lung, or skin cancer   Sexually transmitted infections, such as chlamydia and gonorrhea   Mental health conditions like depression and anxiety  Your doctor will talk to you about the different types of screening tests. They can help you decide which screenings to have. They can also explain what the results might mean.   Answering questions - The physical is a good time to ask the doctor or nurse questions about your health. If needed, they can refer you to other doctors or specialists, too.  Adults older than 65 years often need other care, too. As you get older, your doctor will talk to you about:   How to prevent falling at " home   Hearing or vision tests   Memory testing   How to take your medicines safely   Making sure that you have the help and support you need at home  All topics are updated as new evidence becomes available and our peer review process is complete.  This topic retrieved from [employer] on: May 02, 2024.  Topic 326385 Version 1.0  Release: 32.4.3 - C32.122  © 2024 UpToDate, Inc. and/or its affiliates. All rights reserved.  Consumer Information Use and Disclaimer   Disclaimer: This generalized information is a limited summary of diagnosis, treatment, and/or medication information. It is not meant to be comprehensive and should be used as a tool to help the user understand and/or assess potential diagnostic and treatment options. It does NOT include all information about conditions, treatments, medications, side effects, or risks that may apply to a specific patient. It is not intended to be medical advice or a substitute for the medical advice, diagnosis, or treatment of a health care provider based on the health care provider's examination and assessment of a patient's specific and unique circumstances. Patients must speak with a health care provider for complete information about their health, medical questions, and treatment options, including any risks or benefits regarding use of medications. This information does not endorse any treatments or medications as safe, effective, or approved for treating a specific patient. UpToDate, Inc. and its affiliates disclaim any warranty or liability relating to this information or the use thereof.The use of this information is governed by the Terms of Use, available at https://www.woltersProsensauwer.com/en/know/clinical-effectiveness-terms. 2024© UpToDate, Inc. and its affiliates and/or licensors. All rights reserved.  Copyright   © 2024 UpToDate, Inc. and/or its affiliates. All rights reserved.

## 2024-12-06 NOTE — ASSESSMENT & PLAN NOTE
She decreased dose of omeprazole on her own from 40mg to 20 mg and is doing well   Requests refill on this.   Orders:    omeprazole (PriLOSEC) 20 mg delayed release capsule; Take 1 capsule (20 mg total) by mouth daily before breakfast

## 2024-12-09 ENCOUNTER — OFFICE VISIT (OUTPATIENT)
Dept: FAMILY MEDICINE CLINIC | Facility: CLINIC | Age: 54
End: 2024-12-09
Payer: COMMERCIAL

## 2024-12-09 VITALS
TEMPERATURE: 98.4 F | BODY MASS INDEX: 29.96 KG/M2 | SYSTOLIC BLOOD PRESSURE: 120 MMHG | WEIGHT: 182.8 LBS | HEART RATE: 96 BPM | DIASTOLIC BLOOD PRESSURE: 80 MMHG | OXYGEN SATURATION: 97 %

## 2024-12-09 DIAGNOSIS — Z13.1 SCREENING FOR DIABETES MELLITUS: ICD-10-CM

## 2024-12-09 DIAGNOSIS — K21.9 GASTROESOPHAGEAL REFLUX DISEASE, UNSPECIFIED WHETHER ESOPHAGITIS PRESENT: ICD-10-CM

## 2024-12-09 DIAGNOSIS — I10 ESSENTIAL HYPERTENSION: ICD-10-CM

## 2024-12-09 DIAGNOSIS — R10.9 FLANK PAIN: ICD-10-CM

## 2024-12-09 DIAGNOSIS — G47.00 INSOMNIA, UNSPECIFIED TYPE: ICD-10-CM

## 2024-12-09 DIAGNOSIS — J45.20 MILD INTERMITTENT ASTHMA, UNSPECIFIED WHETHER COMPLICATED: ICD-10-CM

## 2024-12-09 DIAGNOSIS — R79.89 LOW TSH LEVEL: ICD-10-CM

## 2024-12-09 DIAGNOSIS — Z00.00 ANNUAL PHYSICAL EXAM: Primary | ICD-10-CM

## 2024-12-09 DIAGNOSIS — E78.2 MIXED HYPERLIPIDEMIA: ICD-10-CM

## 2024-12-09 DIAGNOSIS — M67.442 GANGLION CYST OF JOINT OF FINGER OF LEFT HAND: ICD-10-CM

## 2024-12-09 DIAGNOSIS — R31.29 MICROSCOPIC HEMATURIA: ICD-10-CM

## 2024-12-09 LAB
SL AMB  POCT GLUCOSE, UA: NEGATIVE
SL AMB LEUKOCYTE ESTERASE,UA: NEGATIVE
SL AMB POCT BILIRUBIN,UA: NEGATIVE
SL AMB POCT BLOOD,UA: ABNORMAL
SL AMB POCT CLARITY,UA: CLEAR
SL AMB POCT COLOR,UA: YELLOW
SL AMB POCT KETONES,UA: NEGATIVE
SL AMB POCT NITRITE,UA: NEGATIVE
SL AMB POCT PH,UA: 6
SL AMB POCT SPECIFIC GRAVITY,UA: 1.01
SL AMB POCT URINE PROTEIN: NEGATIVE
SL AMB POCT UROBILINOGEN: ABNORMAL

## 2024-12-09 PROCEDURE — 81002 URINALYSIS NONAUTO W/O SCOPE: CPT | Performed by: FAMILY MEDICINE

## 2024-12-09 PROCEDURE — 99396 PREV VISIT EST AGE 40-64: CPT | Performed by: FAMILY MEDICINE

## 2024-12-09 PROCEDURE — 99214 OFFICE O/P EST MOD 30 MIN: CPT | Performed by: FAMILY MEDICINE

## 2024-12-09 RX ORDER — TRAZODONE HYDROCHLORIDE 50 MG/1
50 TABLET, FILM COATED ORAL
Qty: 90 TABLET | Refills: 3 | Status: SHIPPED | OUTPATIENT
Start: 2024-12-09

## 2024-12-09 RX ORDER — LISINOPRIL AND HYDROCHLOROTHIAZIDE 12.5; 2 MG/1; MG/1
1 TABLET ORAL DAILY
Qty: 90 TABLET | Refills: 1 | Status: SHIPPED | OUTPATIENT
Start: 2024-12-09

## 2024-12-10 ENCOUNTER — HOSPITAL ENCOUNTER (OUTPATIENT)
Dept: RADIOLOGY | Facility: HOSPITAL | Age: 54
Discharge: HOME | End: 2024-12-10
Payer: COMMERCIAL

## 2024-12-10 DIAGNOSIS — R31.29 MICROSCOPIC HEMATURIA: ICD-10-CM

## 2024-12-10 DIAGNOSIS — R10.9 FLANK PAIN: ICD-10-CM

## 2024-12-10 PROCEDURE — 74018 RADEX ABDOMEN 1 VIEW: CPT

## 2024-12-11 ENCOUNTER — RESULTS FOLLOW-UP (OUTPATIENT)
Dept: FAMILY MEDICINE CLINIC | Facility: CLINIC | Age: 54
End: 2024-12-11

## 2024-12-11 DIAGNOSIS — I10 ESSENTIAL HYPERTENSION: Primary | ICD-10-CM

## 2024-12-11 DIAGNOSIS — R79.89 LOW TSH LEVEL: ICD-10-CM

## 2024-12-11 DIAGNOSIS — E78.2 MIXED HYPERLIPIDEMIA: ICD-10-CM

## 2024-12-11 LAB
BACTERIA UR CULT: NORMAL
Lab: NORMAL

## 2024-12-12 NOTE — TELEPHONE ENCOUNTER
----- Message from Abimbola Montoya DO sent at 12/12/2024  9:12 AM EST -----  Let patient know that her xray did not show kidney stone.   Should call me or return to the office should the flank pain recur.

## 2024-12-14 LAB
ALBUMIN SERPL-MCNC: 4.5 G/DL (ref 3.8–4.9)
ALP SERPL-CCNC: 85 IU/L (ref 44–121)
ALT SERPL-CCNC: 18 IU/L (ref 0–32)
AST SERPL-CCNC: 20 IU/L (ref 0–40)
BILIRUB SERPL-MCNC: 0.5 MG/DL (ref 0–1.2)
BUN SERPL-MCNC: 15 MG/DL (ref 6–24)
BUN/CREAT SERPL: 19 (ref 9–23)
CALCIUM SERPL-MCNC: 9.6 MG/DL (ref 8.7–10.2)
CHLORIDE SERPL-SCNC: 105 MMOL/L (ref 96–106)
CHOLEST SERPL-MCNC: 233 MG/DL (ref 100–199)
CHOLEST/HDLC SERPL: 3.9 RATIO (ref 0–4.4)
CO2 SERPL-SCNC: 25 MMOL/L (ref 20–29)
CREAT SERPL-MCNC: 0.8 MG/DL (ref 0.57–1)
EGFR: 86 ML/MIN/1.73
GLOBULIN SER-MCNC: 2.3 G/DL (ref 1.5–4.5)
GLUCOSE SERPL-MCNC: 91 MG/DL (ref 70–99)
HDLC SERPL-MCNC: 59 MG/DL
LDLC SERPL CALC-MCNC: 156 MG/DL (ref 0–99)
POTASSIUM SERPL-SCNC: 4.2 MMOL/L (ref 3.5–5.2)
PROT SERPL-MCNC: 6.8 G/DL (ref 6–8.5)
SL AMB VLDL CHOLESTEROL CALC: 18 MG/DL (ref 5–40)
SODIUM SERPL-SCNC: 142 MMOL/L (ref 134–144)
TRIGL SERPL-MCNC: 104 MG/DL (ref 0–149)

## 2025-01-02 DIAGNOSIS — R05.9 COUGH: ICD-10-CM

## 2025-01-03 RX ORDER — ALBUTEROL SULFATE 90 UG/1
2 INHALANT RESPIRATORY (INHALATION) EVERY 4 HOURS PRN
Qty: 18 G | Refills: 5 | Status: SHIPPED | OUTPATIENT
Start: 2025-01-03

## 2025-03-05 NOTE — ASSESSMENT & PLAN NOTE
Lab Results   Component Value Date    TSH 0.544 11/15/2024             Patient no-showed today's appointment. Discussed with both patient and she apologized several times, saying she got the time mixed up and thought it was at 5:45. Did confirm next appointment on 3/10 at 5:45. Patient confirmed that appointment.

## 2025-03-06 ENCOUNTER — TELEPHONE (OUTPATIENT)
Age: 55
End: 2025-03-06

## 2025-03-06 DIAGNOSIS — E05.90 SUBCLINICAL HYPERTHYROIDISM: Primary | ICD-10-CM

## 2025-03-06 NOTE — TELEPHONE ENCOUNTER
Pt called in wanting to know if she has to do any blood work before coming into her appt. Please call pt and advise.

## 2025-03-10 ENCOUNTER — TELEPHONE (OUTPATIENT)
Age: 55
End: 2025-03-10

## 2025-03-17 DIAGNOSIS — Z12.31 ENCOUNTER FOR SCREENING MAMMOGRAM FOR BREAST CANCER: Primary | ICD-10-CM

## 2025-04-05 LAB
T3FREE SERPL-MCNC: 3.2 PG/ML (ref 2–4.4)
T4 FREE SERPL-MCNC: 1.4 NG/DL (ref 0.82–1.77)
TSH SERPL DL<=0.005 MIU/L-ACNC: 0.44 UIU/ML (ref 0.45–4.5)

## 2025-04-07 ENCOUNTER — RESULTS FOLLOW-UP (OUTPATIENT)
Dept: ENDOCRINOLOGY | Facility: HOSPITAL | Age: 55
End: 2025-04-07

## 2025-04-17 ENCOUNTER — OFFICE VISIT (OUTPATIENT)
Dept: FAMILY MEDICINE CLINIC | Facility: CLINIC | Age: 55
End: 2025-04-17
Payer: COMMERCIAL

## 2025-04-17 VITALS
RESPIRATION RATE: 19 BRPM | TEMPERATURE: 99 F | HEART RATE: 96 BPM | SYSTOLIC BLOOD PRESSURE: 136 MMHG | WEIGHT: 192 LBS | DIASTOLIC BLOOD PRESSURE: 72 MMHG | BODY MASS INDEX: 31.46 KG/M2 | OXYGEN SATURATION: 98 %

## 2025-04-17 DIAGNOSIS — B35.1 ONYCHOMYCOSIS: Primary | ICD-10-CM

## 2025-04-17 DIAGNOSIS — Z51.81 MEDICATION MONITORING ENCOUNTER: ICD-10-CM

## 2025-04-17 PROCEDURE — 99213 OFFICE O/P EST LOW 20 MIN: CPT | Performed by: FAMILY MEDICINE

## 2025-04-17 RX ORDER — TERBINAFINE HYDROCHLORIDE 250 MG/1
250 TABLET ORAL DAILY
Qty: 30 TABLET | Refills: 0 | Status: SHIPPED | OUTPATIENT
Start: 2025-04-17 | End: 2025-05-17

## 2025-04-17 NOTE — PROGRESS NOTES
Name: Lucinda Dumont      : 1970      MRN: [id number]  Encounter Provider: Abimbola Montoya DO  Encounter Date: 2025   Encounter department: [facility]    Assessment & Plan  Onychomycosis  All nails lifted with debris under surface of nails.   Check cmp  Nail clipping sent for fungal culture  Treat with lamisil 250 mg daily for 30 days  Recheck 1 month.        Medication monitoring encounter              History of Present Illness     HPI  Patient is a 54 year old female with hypertension, hyperlipidemia, GERD, asthma, and insomnia who is here today for a nail issue.   Started getting her nails done 8 months ago (gel nails). Took them off 4 months ago and noticed a change in her nails. Nails are discolored and lifting from nailbed.   When she was getting nails done, skin around nails became red and painful. Not sure if it was the nail material or the UV light.   Review of Systems  Past Medical History:   Diagnosis Date    Allergies     BRCA1 negative     GERD (gastroesophageal reflux disease)     Hyperlipidemia     Hypertension     Kidney stone     Plantar fasciitis      Past Surgical History:   Procedure Laterality Date    APPENDECTOMY      BREAST BIOPSY Left 2006    benign    KIDNEY STONE SURGERY       Family History   Problem Relation Age of Onset    Kidney failure Mother     Osteoporosis Mother     Heart disease Father          from heart disease at age 70    BRCA1 Negative Sister     Breast cancer Sister 46    Breast cancer additional onset Sister 53    Pancreatic cancer Sister 58    No Known Problems Sister     No Known Problems Sister     No Known Problems Sister     No Known Problems Sister     No Known Problems Sister     Breast cancer Brother 65    No Known Problems Maternal Aunt      Social History     Tobacco Use    Smoking status: Never    Smokeless tobacco: Never   Vaping Use    Vaping status: Never Used   Substance and Sexual Activity     Alcohol use: Not Currently     Alcohol/week: 2.0 standard drinks of alcohol     Types: 2 Standard drinks or equivalent per week     Comment: social    Drug use: Never    Sexual activity: Not Currently     Partners: Male     Birth control/protection: None     Current Outpatient Medications on File Prior to Visit   Medication Sig    albuterol (Ventolin HFA) 90 mcg/act inhaler Inhale 2 puffs every 4 (four) hours as needed for wheezing or shortness of breath    lisinopril-hydrochlorothiazide (PRINZIDE,ZESTORETIC) 20-12.5 MG per tablet Take 1 tablet by mouth daily    omeprazole (PriLOSEC) 20 mg delayed release capsule Take 1 capsule (20 mg total) by mouth daily before breakfast    saccharomyces boulardii (FLORASTOR) 250 mg capsule Take 250 mg by mouth 2 (two) times a day    sucralfate (CARAFATE) 1 g tablet Take 1 tablet (1 g total) by mouth 4 (four) times a day (Patient taking differently: Take 1 g by mouth 4 (four) times a day as needed Reflux)    traZODone (DESYREL) 50 mg tablet Take 1 tablet (50 mg total) by mouth daily at bedtime    vitamin B-12 (VITAMIN B-12) 1,000 mcg tablet Take 1,000 mcg by mouth daily     No Known Allergies  Immunization History   Administered Date(s) Administered    COVID-19 PFIZER VACCINE 0.3 ML IM 04/15/2021, 05/10/2021, 11/27/2021    COVID-19 Pfizer Vac BIVALENT Isra-sucrose 12 Yr+ IM 09/20/2022    INFLUENZA 09/18/2018, 08/21/2019, 11/09/2020, 10/18/2021, 10/24/2022    Influenza Recombinant Preservative Free Im 11/04/2024    Influenza, injectable, quadrivalent, preservative free 0.5 mL 10/20/2023    Pneumococcal Conjugate Vaccine 20-valent (Pcv20), Polysace 10/20/2023    Tuberculin Skin Test-PPD Intradermal 11/28/2015    Zoster Vaccine Recombinant 11/15/2024     Objective   /72 (BP Location: Left arm, Patient Position: Sitting, Cuff Size: Standard)   Pulse 96   Temp 99 °F (37.2 °C)   Resp 19   Wt 87.1 kg (192 lb)   SpO2 98%   BMI 31.46 kg/m²     Physical Exam  Skin:     Comments:  Nails are lifting from nailbed distally. Nails are no thickened but there is black and white crusty debris under surface of nails

## 2025-04-18 ENCOUNTER — TELEPHONE (OUTPATIENT)
Dept: ADMINISTRATIVE | Facility: OTHER | Age: 55
End: 2025-04-18

## 2025-04-18 NOTE — TELEPHONE ENCOUNTER
----- Message from Abimbola Montoya DO sent at 4/17/2025  4:56 PM EDT -----  Regarding: care gap request  11/18/20 3:57 PM    Hello, our patient attached above has had Pap Smear (HPV) aka Cervical Cancer Screening completed/performed. Please assist in updating the patient chart by pulling the document from the Media Tab. The date of service is 3/19/25.     Thank you,  Abimbola Montoya DO  [facility]

## 2025-04-18 NOTE — LETTER
Procedure Request Form: Cervical Cancer Screening      Date Requested: 25  Patient: Lucinda Dumont  Patient : 1970   Referring Provider: Abimbola Montoya, DO        Date of Procedure ______________________________       The above patient has informed us that they have completed their   most recent Cervical Cancer Screening at your facility. Please complete   this form and attach all corresponding procedure reports/results.    Comments __2025  ________________________________________________________  ____________________________________________________________________  ____________________________________________________________________  ____________________________________________________________________    Facility Completing Procedure _________________________________________    Form Completed By (print name) _______________________________________      Signature __________________________________________________________      These reports are needed for  compliance.    Please fax this completed form and a copy of the procedure report to the [facility] Based Department as soon as possible via Fax [phone], attention [place]: Phone [phone]. Our office is located at [address] [place] [address].    We thank you for your assistance in treating our mutual patient.

## 2025-04-19 LAB
ALBUMIN SERPL-MCNC: 4.4 G/DL (ref 3.8–4.9)
ALP SERPL-CCNC: 80 IU/L (ref 44–121)
ALT SERPL-CCNC: 18 IU/L (ref 0–32)
AST SERPL-CCNC: 19 IU/L (ref 0–40)
BILIRUB SERPL-MCNC: 0.4 MG/DL (ref 0–1.2)
BUN SERPL-MCNC: 16 MG/DL (ref 6–24)
BUN/CREAT SERPL: 22 (ref 9–23)
CALCIUM SERPL-MCNC: 9.3 MG/DL (ref 8.7–10.2)
CHLORIDE SERPL-SCNC: 104 MMOL/L (ref 96–106)
CHOLEST SERPL-MCNC: 255 MG/DL (ref 100–199)
CHOLEST/HDLC SERPL: 4.4 RATIO (ref 0–4.4)
CO2 SERPL-SCNC: 23 MMOL/L (ref 20–29)
CREAT SERPL-MCNC: 0.73 MG/DL (ref 0.57–1)
EGFR: 96 ML/MIN/1.73
GLOBULIN SER-MCNC: 2.3 G/DL (ref 1.5–4.5)
GLUCOSE SERPL-MCNC: 93 MG/DL (ref 70–99)
HDLC SERPL-MCNC: 58 MG/DL
LDLC SERPL CALC-MCNC: 178 MG/DL (ref 0–99)
POTASSIUM SERPL-SCNC: 4 MMOL/L (ref 3.5–5.2)
PROT SERPL-MCNC: 6.7 G/DL (ref 6–8.5)
SL AMB VLDL CHOLESTEROL CALC: 19 MG/DL (ref 5–40)
SODIUM SERPL-SCNC: 140 MMOL/L (ref 134–144)
TRIGL SERPL-MCNC: 110 MG/DL (ref 0–149)
TSH SERPL DL<=0.005 MIU/L-ACNC: 0.5 UIU/ML (ref 0.45–4.5)

## 2025-04-22 NOTE — PROGRESS NOTES
Name: Lucinda Dumont      : 1970      MRN: [id number]  Encounter Provider: Jessica Cross MD  Encounter Date: 2025   Encounter department: [facility] [place]    No chief complaint on file.  :  Assessment & Plan    Hyperthyroidism, likely due to subclinical:- US positive for heterogenous gland indicating subclinical GD with Ab neg and hence disease in subclinical phase. no cardiac or OP hx and TSH not persistantly <0.1 and hence Tx not needed. Continue to trend. Repeat labs in 6 months or PRN     Discussed with the patient and all questioned fully answered. She will call me if any problems arise.     RTC in 1 year    History of Present Illness     Lucinda Dumont is a 54 y.o. female    Pertinent Medical History   Lucinda Dumont is a 53 y.o. female with low TSH seen on routine testing. Patient reports she has known low TSH years ago and PCP keeps routine testing and since TSH was persistantly low referred to us. Found to have subclinical hyperthyroidism w.o overt symptoms and hence continued to trend labs for now. Comes for follow up      S/sx: reports palpitations but only when she exercises, Denies tremor, changes in hair/skin/nails, diarrhea, blurry vision or double vision.    TSH  TSH 2.9,  TSH 0.285, T4 1.55. most recent labs  TSH 0.44, with T4 1.4. neg TSI/TRAb. US thyroid positive for heterogenous gland without any nodules.      Fhx:   thyroid disorder- Neg          Review of Systems   Constitutional:  Negative for diaphoresis, fatigue and unexpected weight change.   Eyes:  Negative for photophobia and visual disturbance.   Gastrointestinal:  Negative for constipation, diarrhea and vomiting.   Endocrine: Negative for polydipsia and polyuria.   Skin: Negative.     as per HPI     Medical History Reviewed by provider this encounter:     .    Objective   There were no vitals taken for this visit.     There is no height or weight on file to calculate  BMI.  Wt Readings from Last 3 Encounters:   04/17/25 87.1 kg (192 lb)   12/09/24 82.9 kg (182 lb 12.8 oz)   09/12/24 83 kg (183 lb)     Physical Exam  Constitutional:       Appearance: Normal appearance. She is obese.   Cardiovascular:      Rate and Rhythm: Normal rate.   Pulmonary:      Effort: Pulmonary effort is normal.   Abdominal:      General: Bowel sounds are normal.      Palpations: Abdomen is soft.   Skin:     General: Skin is warm.      Capillary Refill: Capillary refill takes less than 2 seconds.   Neurological:      General: No focal deficit present.      Mental Status: She is alert.   Psychiatric:         Mood and Affect: Mood normal.         Labs:   Lab Results   Component Value Date    HGBA1C 5.4 11/03/2023        Latest Reference Range & Units 11/03/23 09:42 06/14/24 09:48 07/12/24 10:16 11/15/24 10:49 04/04/25 10:20 04/18/25 09:49   TSH, POC 0.450 - 4.500 uIU/mL 0.493 0.299 (L) 0.285 (L) 0.544 0.444 (L) 0.497   FREE T4 0.82 - 1.77 ng/dL   1.55 1.48 1.40    (L): Data is abnormally low    THYROID ULTRASOUND     INDICATION: E04.1: Nontoxic single thyroid nodule.     COMPARISON: None     TECHNIQUE: Ultrasound of the thyroid was performed with a high frequency linear transducer in transverse and sagittal planes including volumetric imaging sweeps as well as traditional still imaging technique.     FINDINGS:  Thyroid texture: Thyroid parenchyma is diffusely heterogeneous in echotexture.     Right lobe: 5.2 x 2.4 x 1.9 cm. Volume 11.3 mL  Left lobe: 4.4 x 1.6 x 1.5 cm. Volume 5.2 mL  Isthmus: 0.3 cm.     Multiple small cysts throughout both lobes of the thyroid, largest measuring 0.9 x 0.4 x 0.6 cm in the anterior right mid gland. The gland is mildly heterogeneous with note of a more defined focal heterogeneous region within the right posterior mid   gland.     IMPRESSION:     Mildly heterogeneous gland without definitive solid nodule.  Discussed with the patient and all questioned fully answered. She will  call me if any problems arise.

## 2025-04-24 ENCOUNTER — OFFICE VISIT (OUTPATIENT)
Dept: ENDOCRINOLOGY | Facility: HOSPITAL | Age: 55
End: 2025-04-24
Payer: COMMERCIAL

## 2025-04-24 VITALS
DIASTOLIC BLOOD PRESSURE: 76 MMHG | HEIGHT: 66 IN | HEART RATE: 71 BPM | BODY MASS INDEX: 30.76 KG/M2 | SYSTOLIC BLOOD PRESSURE: 110 MMHG | WEIGHT: 191.4 LBS

## 2025-04-24 DIAGNOSIS — E05.90 SUBCLINICAL HYPERTHYROIDISM: Primary | ICD-10-CM

## 2025-04-24 DIAGNOSIS — E05.90 HYPERTHYROIDISM: ICD-10-CM

## 2025-04-24 PROCEDURE — 99214 OFFICE O/P EST MOD 30 MIN: CPT | Performed by: STUDENT IN AN ORGANIZED HEALTH CARE EDUCATION/TRAINING PROGRAM

## 2025-05-01 ENCOUNTER — RESULTS FOLLOW-UP (OUTPATIENT)
Dept: FAMILY MEDICINE CLINIC | Facility: CLINIC | Age: 55
End: 2025-05-01

## 2025-05-01 LAB
FUNGUS SPEC CULT: ABNORMAL
FUNGUS SPEC FUNGUS STN: ABNORMAL
Lab: ABNORMAL
Lab: ABNORMAL
Lab: NORMAL

## 2025-05-06 NOTE — TELEPHONE ENCOUNTER
Upon review of the In Basket request and the patient's chart, initial outreach has been made via fax to facility. Please see Contacts section for details.     Thank you  Morelia Caballero MA

## 2025-05-06 NOTE — TELEPHONE ENCOUNTER
Upon review of the In Basket request we were able to locate, review, and update the patient chart as requested for Pap Smear (HPV) aka Cervical Cancer Screening.    Any additional questions or concerns should be emailed to the Practice Liaisons via the appropriate education email address, please do not reply via In Basket.    Thank you  Morelai Caballero MA   [facility]

## 2025-05-21 DIAGNOSIS — Z51.81 MEDICATION MONITORING ENCOUNTER: ICD-10-CM

## 2025-05-21 DIAGNOSIS — B35.1 ONYCHOMYCOSIS: Primary | ICD-10-CM

## 2025-05-21 RX ORDER — TERBINAFINE HYDROCHLORIDE 250 MG/1
250 TABLET ORAL DAILY
Qty: 30 TABLET | Refills: 0 | Status: SHIPPED | OUTPATIENT
Start: 2025-05-21 | End: 2025-06-20

## 2025-05-23 ENCOUNTER — RESULTS FOLLOW-UP (OUTPATIENT)
Dept: FAMILY MEDICINE CLINIC | Facility: CLINIC | Age: 55
End: 2025-05-23

## 2025-05-23 LAB
ALBUMIN SERPL-MCNC: 4.5 G/DL (ref 3.8–4.9)
ALP SERPL-CCNC: 75 IU/L (ref 44–121)
ALT SERPL-CCNC: 14 IU/L (ref 0–32)
ALT SERPL-CCNC: 15 IU/L (ref 0–32)
AST SERPL-CCNC: 18 IU/L (ref 0–40)
AST SERPL-CCNC: 19 IU/L (ref 0–40)
BILIRUB SERPL-MCNC: 0.4 MG/DL (ref 0–1.2)
BUN SERPL-MCNC: 13 MG/DL (ref 6–24)
BUN/CREAT SERPL: 16 (ref 9–23)
CALCIUM SERPL-MCNC: 9.5 MG/DL (ref 8.7–10.2)
CHLORIDE SERPL-SCNC: 103 MMOL/L (ref 96–106)
CO2 SERPL-SCNC: 20 MMOL/L (ref 20–29)
CREAT SERPL-MCNC: 0.81 MG/DL (ref 0.57–1)
EGFR: 85 ML/MIN/1.73
GLOBULIN SER-MCNC: 2.3 G/DL (ref 1.5–4.5)
GLUCOSE SERPL-MCNC: 92 MG/DL (ref 70–99)
POTASSIUM SERPL-SCNC: 3.7 MMOL/L (ref 3.5–5.2)
PROT SERPL-MCNC: 6.8 G/DL (ref 6–8.5)
SODIUM SERPL-SCNC: 140 MMOL/L (ref 134–144)

## 2025-05-29 DIAGNOSIS — I10 ESSENTIAL HYPERTENSION: ICD-10-CM

## 2025-05-30 RX ORDER — LISINOPRIL AND HYDROCHLOROTHIAZIDE 12.5; 2 MG/1; MG/1
1 TABLET ORAL DAILY
Qty: 90 TABLET | Refills: 0 | Status: SHIPPED | OUTPATIENT
Start: 2025-05-30

## 2025-06-05 ENCOUNTER — HOSPITAL ENCOUNTER (OUTPATIENT)
Dept: MAMMOGRAPHY | Facility: IMAGING CENTER | Age: 55
Discharge: HOME | End: 2025-06-05
Payer: COMMERCIAL

## 2025-06-05 VITALS — BODY MASS INDEX: 28.12 KG/M2 | HEIGHT: 66 IN | WEIGHT: 175 LBS

## 2025-06-05 DIAGNOSIS — Z12.31 ENCOUNTER FOR SCREENING MAMMOGRAM FOR BREAST CANCER: ICD-10-CM

## 2025-06-05 PROCEDURE — 77063 BREAST TOMOSYNTHESIS BI: CPT

## 2025-06-05 PROCEDURE — 77067 SCR MAMMO BI INCL CAD: CPT

## 2025-06-06 NOTE — PROGRESS NOTES
"                          Name: Lucinda Dumont      : 1970      MRN: [id number]  Encounter Provider: Abimbola Montoya DO  Encounter Date: 2025   Encounter department: [facility]    Assessment & Plan  Essential hypertension  BP at goal on current medication  Lab Results   Component Value Date    SODIUM 140 2025    K 3.7 2025     2025    CO2 20 2025    AGAP 10 2019    BUN 13 2025    CREATININE 0.81 2025    GLUC 92 2025    CALCIUM 9.0 2019    AST 18 2025    ALT 15 2025    ALKPHOS 85 2019    TP 6.8 2025    TBILI 0.4 2025    EGFR 85 2025     Continue same.          Gastroesophageal reflux disease, unspecified whether esophagitis present  Stable on current meds         Insomnia, unspecified type  Stable on trazodone          Mixed hyperlipidemia  Lab Results   Component Value Date    CHOLESTEROL 255 (H) 2025    CHOLESTEROL 233 (H) 2024    CHOLESTEROL 140 2024     Lab Results   Component Value Date    HDL 58 2025    HDL 59 2024    HDL 44 2024     Lab Results   Component Value Date    TRIG 110 2025    TRIG 104 2024    TRIG 95 2024     No results found for: \"NONHDLC\"  The 10-year ASCVD risk score (Adalberto BAUTISTA, et al., 2019) is: 4%    Values used to calculate the score:      Age: 54 years      Clincally relevant sex: Female      Is Non- : No      Diabetic: No      Tobacco smoker: No      Systolic Blood Pressure: 136 mmHg      Is BP treated: Yes      HDL Cholesterol: 58 mg/dL      Total Cholesterol: 255 mg/dL  Discussed diet and exercise  Statin therapy not recommended         Mild intermittent asthma, unspecified whether complicated  Using inhaler about 3 times per week. During allergy season  Change albuterol to aisupra    Orders:    Albuterol-Budesonide 90-80 MCG/ACT AERO; Inhale 2 puffs every 4 (four) hours as " needed (wheezing or shortness of breath)    Medial epicondylitis of left elbow  Rest, ice, ibuprofen  Printed exercises provided       Seborrheic keratosis  reassured            History of Present Illness     HPI  Patient is a 54 year old female with hypertension, hyperlipidemia, asthma, GERD, insomnia who is being seen today for f/u visit.     On zestoretic for her hypertension. Tolerates well. Denies any dizziness, headaches, chest pain or shortness of breath.     Had lipid panel done in April. Her numbers were elevated with total cholesterol of 255 and LDL of 178. Currently on no medication for this.   Her 10 year ASCVD risk score is 4%   On carafate prn and prilosec for her GERD.     On trazodone for her insomnia which is working well    Additional concerns today include a mole in upper right thigh. Has been present for a couple of months.   Having pain in left elbow.  Pain is medial. 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)  Review of Systems  Past Medical History[1]  Past Surgical History[2]  Family History[3]  Social History[4]  Medications[5]  No Known Allergies  Immunization History   Administered Date(s) Administered    COVID-19 PFIZER VACCINE 0.3 ML IM 04/15/2021, 05/10/2021, 11/27/2021    COVID-19 Pfizer Vac BIVALENT Isra-sucrose 12 Yr+ IM 09/20/2022    INFLUENZA 09/18/2018, 08/21/2019, 11/09/2020, 10/18/2021, 10/24/2022    Influenza Recombinant Preservative Free Im 11/04/2024    Influenza, injectable, quadrivalent, preservative free 0.5 mL 10/20/2023    Pneumococcal Conjugate Vaccine 20-valent (Pcv20), Polysace 10/20/2023    Tuberculin Skin Test-PPD Intradermal 11/28/2015    Zoster Vaccine Recombinant 11/15/2024     Objective   There were no vitals taken for this visit.    Physical Exam  Vitals and nursing note reviewed.   Constitutional:       General: She is not in acute distress.     Appearance: Normal appearance. She is not ill-appearing, toxic-appearing  or diaphoretic.     Cardiovascular:      Rate and Rhythm: Normal rate and regular rhythm.      Heart sounds: No murmur heard.  Pulmonary:      Effort: Pulmonary effort is normal.      Breath sounds: Normal breath sounds.   Abdominal:      General: There is no distension.      Palpations: Abdomen is soft. There is no mass.      Tenderness: There is no abdominal tenderness.     Musculoskeletal:      Cervical back: Normal range of motion and neck supple.      Right lower leg: No edema.      Left lower leg: No edema.      Comments: Tenderness medial epicondyle.   Reproduction of pain with resisted pronation and flexion of wrist   Lymphadenopathy:      Cervical: No cervical adenopathy.     Skin:     Findings: No rash.      Comments: Fingernails with thickening distally    Has raised waxy lesion upper thigh. Non-homogenous coloration     Neurological:      General: No focal deficit present.      Mental Status: She is alert and oriented to person, place, and time.              [1]   Past Medical History:  Diagnosis Date    Allergies     BRCA1 negative     GERD (gastroesophageal reflux disease)     Hyperlipidemia     Hypertension     Kidney stone     Plantar fasciitis    [2]   Past Surgical History:  Procedure Laterality Date    APPENDECTOMY      BREAST EXCISIONAL BIOPSY Left 2006    benign    KIDNEY STONE SURGERY     [3]   Family History  Problem Relation Name Age of Onset    Kidney failure Mother Charu Reed     Osteoporosis Mother Charu Reed     Heart disease Father Yony          from heart disease at age 70    BRCA1 Negative Sister Demi     Breast cancer Sister Demi 46        Had breast cancer in both breasts    Breast cancer additional onset Sister Demi 53        Had breast cancer in  at age 46,  other breast 2017 age 52, 2023 age 56 pancreatic cancer, liver cancer and apinal cancer    Pancreatic cancer Sister Demi 58    No Known Problems Sister      No Known Problems Sister      No Known Problems Sister       No Known Problems Sister      No Known Problems Sister      No Known Problems Sister      No Known Problems Maternal Grandmother      No Known Problems Maternal Grandfather      No Known Problems Paternal Grandmother      No Known Problems Paternal Grandfather      Breast cancer Brother  65    No Known Problems Maternal Aunt      No Known Problems Paternal Aunt      No Known Problems Paternal Aunt      No Known Problems Paternal Aunt      No Known Problems Paternal Aunt      No Known Problems Paternal Aunt      No Known Problems Paternal Aunt     [4]   Social History  Tobacco Use    Smoking status: Never    Smokeless tobacco: Never   Vaping Use    Vaping status: Never Used   Substance and Sexual Activity    Alcohol use: Not Currently     Alcohol/week: 2.0 standard drinks of alcohol     Types: 2 Standard drinks or equivalent per week     Comment: social    Drug use: Never    Sexual activity: Not Currently     Partners: Male     Birth control/protection: None   [5]   Current Outpatient Medications on File Prior to Visit   Medication Sig    albuterol (Ventolin HFA) 90 mcg/act inhaler Inhale 2 puffs every 4 (four) hours as needed for wheezing or shortness of breath    lisinopril-hydrochlorothiazide (PRINZIDE,ZESTORETIC) 20-12.5 MG per tablet Take 1 tablet by mouth once daily    omeprazole (PriLOSEC) 20 mg delayed release capsule Take 1 capsule (20 mg total) by mouth daily before breakfast    saccharomyces boulardii (FLORASTOR) 250 mg capsule Take 250 mg by mouth 2 (two) times a day (Patient not taking: Reported on 4/24/2025)    sucralfate (CARAFATE) 1 g tablet Take 1 tablet (1 g total) by mouth 4 (four) times a day (Patient taking differently: Take 1 g by mouth if needed)    terbinafine (LamISIL) 250 mg tablet Take 1 tablet (250 mg total) by mouth daily    traZODone (DESYREL) 50 mg tablet Take 1 tablet (50 mg total) by mouth daily at bedtime    vitamin B-12 (VITAMIN B-12) 1,000 mcg tablet Take 500 mcg by mouth daily

## 2025-06-09 ENCOUNTER — OFFICE VISIT (OUTPATIENT)
Dept: FAMILY MEDICINE CLINIC | Facility: CLINIC | Age: 55
End: 2025-06-09
Payer: COMMERCIAL

## 2025-06-09 ENCOUNTER — RESULTS FOLLOW-UP (OUTPATIENT)
Dept: FAMILY MEDICINE CLINIC | Facility: CLINIC | Age: 55
End: 2025-06-09

## 2025-06-09 VITALS
SYSTOLIC BLOOD PRESSURE: 118 MMHG | OXYGEN SATURATION: 96 % | BODY MASS INDEX: 31.07 KG/M2 | TEMPERATURE: 97.7 F | WEIGHT: 189.6 LBS | DIASTOLIC BLOOD PRESSURE: 80 MMHG | HEART RATE: 89 BPM

## 2025-06-09 DIAGNOSIS — L82.1 SEBORRHEIC KERATOSIS: ICD-10-CM

## 2025-06-09 DIAGNOSIS — G47.00 INSOMNIA, UNSPECIFIED TYPE: ICD-10-CM

## 2025-06-09 DIAGNOSIS — I10 ESSENTIAL HYPERTENSION: Primary | ICD-10-CM

## 2025-06-09 DIAGNOSIS — K21.9 GASTROESOPHAGEAL REFLUX DISEASE, UNSPECIFIED WHETHER ESOPHAGITIS PRESENT: ICD-10-CM

## 2025-06-09 DIAGNOSIS — E78.2 MIXED HYPERLIPIDEMIA: ICD-10-CM

## 2025-06-09 DIAGNOSIS — Z13.29 SCREENING FOR THYROID DISORDER: ICD-10-CM

## 2025-06-09 DIAGNOSIS — M77.02 MEDIAL EPICONDYLITIS OF LEFT ELBOW: ICD-10-CM

## 2025-06-09 DIAGNOSIS — J45.20 MILD INTERMITTENT ASTHMA, UNSPECIFIED WHETHER COMPLICATED: ICD-10-CM

## 2025-06-09 PROCEDURE — 99214 OFFICE O/P EST MOD 30 MIN: CPT | Performed by: FAMILY MEDICINE

## 2025-06-09 NOTE — ASSESSMENT & PLAN NOTE
BP at goal on current medication  Lab Results   Component Value Date    SODIUM 140 05/22/2025    K 3.7 05/22/2025     05/22/2025    CO2 20 05/22/2025    AGAP 10 12/31/2019    BUN 13 05/22/2025    CREATININE 0.81 05/22/2025    GLUC 92 05/22/2025    CALCIUM 9.0 12/31/2019    AST 18 05/22/2025    ALT 15 05/22/2025    ALKPHOS 85 12/31/2019    TP 6.8 05/22/2025    TBILI 0.4 05/22/2025    EGFR 85 05/22/2025     Continue same.

## 2025-06-09 NOTE — PATIENT INSTRUCTIONS
Patient Education     Medial Epicondylitis Discharge Instructions   About this topic   The elbow is where your upper arm bone meets the two lower bones in your arm. There is a bump on the inside of your elbow at the bottom of your upper arm bone. It is the medial epicondyle. A few tendons attach here. Tendons attach muscles to bone. These muscles are used to curl your wrist and fingers down.  When these tendons get sore, swollen, and torn from overuse you have medial epicondylitis. It is also called golfer's elbow. This is a common problem in golf players. It may also happen with other activities or sports.     What care is needed at home?   Ask your doctor what you need to do when you go home. Make sure you ask questions if you do not understand what the doctor says. This way you will know what you need to do.  Rest. Allow your injury to heal before you do slow movements.  Place an ice pack or a bag of frozen peas wrapped in a towel over the painful part. Never put ice right on the skin. Do not leave the ice on more than 10 to 15 minutes at a time. Ice after activity may help decrease pain and swelling. Never ice before stretching.  Prop your elbow on pillows to help with swelling.  Use a brace or strap around the elbow. It can lower pain and forces at the flexor tendons.  Exercises may help. Talk to your doctor or therapist about what exercises you should do.  Heat may be used later but not right away. Heat can make swelling worse. If your doctor tells you to use heat, put a heating pad on the painful part for no more than 20 minutes at a time. Never go to sleep with a heating pad on as this can cause burns.  What follow-up care is needed?   Your doctor may ask you to make visits to the office to check on your progress. Be sure to keep these visits. Your doctor may send you to a specialist called an orthopedic surgeon. Your doctor may send you to physical therapy (PT) or occupational therapy (OT) for treatments  and exercises to help you heal faster.  What drugs may be needed?   The doctor may order drugs to:  Help with pain and swelling  The doctor may give you a shot of an anti-inflammatory drug called a corticosteroid. This will help with swelling. Talk with your doctor about the risks of this shot.  Will physical activity be limited?   You may need to rest your elbow for a while. You should not do physical activity that makes your health problem worse. If you work out or play sports, you may not be able to do those things until your health problem gets better.  What problems could happen?   Long-term elbow pain  Injury returns  Weak and tight muscles  What can be done to prevent this health problem?   Stay active and work out to keep your muscles strong and flexible.  Warm up slowly and stretch before you exercise. Use good training methods and form for sports. Have an expert look at your technique.  When working out with weights, try to keep your elbow slightly bent instead of straightened all the way. Avoid lifting objects when your elbow is fully straightened.  Take breaks often when doing things that use repeat movements.  Take extra care when playing sports. Ask an expert for help when choosing equipment.   Use a hammer with extra padding. Use two hands when using heavy tools.  Keep a healthy weight so there is not extra stress on your joints.  When do I need to call the doctor?   Health problem is not better or you are feeling worse  Teach Back: Helping You Understand   The Teach Back Method helps you understand the information we are giving you. After you talk with the staff, tell them in your own words what you learned. This helps to make sure the staff has described each thing clearly. It also helps to explain things that may have been confusing. Before going home, make sure you can do these:  I can tell you about my condition.  I can tell you what may help ease my pain.  I can tell you what I will do if I am  not feeling better.  Last Reviewed Date   2020-08-26  Consumer Information Use and Disclaimer   This generalized information is a limited summary of diagnosis, treatment, and/or medication information. It is not meant to be comprehensive and should be used as a tool to help the user understand and/or assess potential diagnostic and treatment options. It does NOT include all information about conditions, treatments, medications, side effects, or risks that may apply to a specific patient. It is not intended to be medical advice or a substitute for the medical advice, diagnosis, or treatment of a health care provider based on the health care provider's examination and assessment of a patient’s specific and unique circumstances. Patients must speak with a health care provider for complete information about their health, medical questions, and treatment options, including any risks or benefits regarding use of medications. This information does not endorse any treatments or medications as safe, effective, or approved for treating a specific patient. Up[employer]Date, Inc[employer] and its affiliates disclaim any warranty or liability relating to this information or the use thereof. The use of this information is governed by the Terms of Use, available at https://www.[employer]itesuwer.com/en/know/clinical-effectiveness-terms   Copyright   Copyright © 2024 [employer]ToDate, Inc[employer] and its affiliates and/or licensors. All rights reserved.

## 2025-06-09 NOTE — ASSESSMENT & PLAN NOTE
Using inhaler about 3 times per week. During allergy season  Change albuterol to aisupra    Orders:    Albuterol-Budesonide 90-80 MCG/ACT AERO; Inhale 2 puffs every 4 (four) hours as needed (wheezing or shortness of breath)

## 2025-06-09 NOTE — ASSESSMENT & PLAN NOTE
"Lab Results   Component Value Date    CHOLESTEROL 255 (H) 04/18/2025    CHOLESTEROL 233 (H) 12/13/2024    CHOLESTEROL 140 06/14/2024     Lab Results   Component Value Date    HDL 58 04/18/2025    HDL 59 12/13/2024    HDL 44 06/14/2024     Lab Results   Component Value Date    TRIG 110 04/18/2025    TRIG 104 12/13/2024    TRIG 95 06/14/2024     No results found for: \"NONHDLC\"  The 10-year ASCVD risk score (Adalberto BAUTISTA, et al., 2019) is: 4%    Values used to calculate the score:      Age: 54 years      Clincally relevant sex: Female      Is Non- : No      Diabetic: No      Tobacco smoker: No      Systolic Blood Pressure: 136 mmHg      Is BP treated: Yes      HDL Cholesterol: 58 mg/dL      Total Cholesterol: 255 mg/dL  Discussed diet and exercise  Statin therapy not recommended         "

## 2025-06-10 ENCOUNTER — TELEPHONE (OUTPATIENT)
Age: 55
End: 2025-06-10

## 2025-06-10 NOTE — TELEPHONE ENCOUNTER
PA for Albuterol-Budesonide 90-80 MCG/ACT AERO SUBMITTED to [employer]    via    []CMM-KEY:   [x]Surescripts-Case ID #: [id number]   []Availity-Auth ID #   []Faxed to plan   []Other website   []Phone call Case ID #     [x]PA sent as URGENT    All office notes, labs and other pertaining documents and studies sent. Clinical questions answered. Awaiting determination from insurance company.     Turnaround time for your insurance to make a decision on your Prior Authorization can take 7-21 business days.

## 2025-06-12 NOTE — TELEPHONE ENCOUNTER
PA for Albuterol-Budesonide 90-80 MCG/ACT AERO APPROVED     Date(s) approved Radha 10, 2025 to Radha 10, 2026     Case #: [id number]     Patient advised by      - Pt aware and med was picked up on 6/11    []My Top 10hart Message  []Phone call   []LMOM  []L/M to call office as no active Communication consent on file  []Unable to leave detailed message as VM not approved on Communication consent       Pharmacy advised by    []Fax  [x]Phone call- Paid claim received- Med was picked up on 6/11  []Secure Chat         Approval letter scanned into Media Yes